# Patient Record
Sex: FEMALE | Race: WHITE | HISPANIC OR LATINO | URBAN - METROPOLITAN AREA
[De-identification: names, ages, dates, MRNs, and addresses within clinical notes are randomized per-mention and may not be internally consistent; named-entity substitution may affect disease eponyms.]

---

## 2023-01-23 PROBLEM — L60.0 INGROWN TOENAIL OF BOTH FEET: Status: ACTIVE | Noted: 2023-01-23

## 2023-01-23 PROBLEM — M79.672 FOOT PAIN, BILATERAL: Status: ACTIVE | Noted: 2023-01-23

## 2023-01-23 PROBLEM — R06.83 SNORING: Status: ACTIVE | Noted: 2023-01-23

## 2023-01-23 PROBLEM — R63.4 WEIGHT LOSS: Status: ACTIVE | Noted: 2023-01-23

## 2023-01-23 PROBLEM — M79.671 FOOT PAIN, BILATERAL: Status: ACTIVE | Noted: 2023-01-23

## 2023-01-23 PROBLEM — F41.9 ANXIETY DISORDER: Status: ACTIVE | Noted: 2023-01-23

## 2023-01-23 PROBLEM — L70.0 CYSTIC ACNE: Status: ACTIVE | Noted: 2023-01-23

## 2023-01-23 PROBLEM — F39 MOOD DISORDER (CMS-HCC): Status: ACTIVE | Noted: 2023-01-23

## 2023-01-23 RX ORDER — TRETINOIN 0.5 MG/G
CREAM TOPICAL
COMMUNITY
Start: 2022-03-16

## 2023-01-23 RX ORDER — NORGESTREL AND ETHINYL ESTRADIOL 0.3-0.03MG
1 KIT ORAL DAILY
COMMUNITY
Start: 2022-02-23 | End: 2023-06-24 | Stop reason: SDUPTHER

## 2023-01-23 RX ORDER — ESCITALOPRAM OXALATE 20 MG/1
1 TABLET ORAL DAILY
COMMUNITY
Start: 2022-03-10

## 2023-01-23 RX ORDER — LAMOTRIGINE 200 MG/1
200 TABLET ORAL DAILY
COMMUNITY
Start: 2021-05-19

## 2023-01-23 RX ORDER — SPIRONOLACTONE 100 MG/1
1 TABLET, FILM COATED ORAL DAILY
COMMUNITY
Start: 2022-03-16 | End: 2023-06-24 | Stop reason: SDUPTHER

## 2023-01-23 RX ORDER — TIRZEPATIDE 2.5 MG/.5ML
2.5 INJECTION, SOLUTION SUBCUTANEOUS
COMMUNITY
Start: 2022-09-23 | End: 2023-06-24

## 2023-06-24 ENCOUNTER — OFFICE VISIT (OUTPATIENT)
Dept: PRIMARY CARE | Facility: CLINIC | Age: 28
End: 2023-06-24
Payer: COMMERCIAL

## 2023-06-24 VITALS — DIASTOLIC BLOOD PRESSURE: 72 MMHG | SYSTOLIC BLOOD PRESSURE: 122 MMHG | WEIGHT: 270 LBS | BODY MASS INDEX: 44.93 KG/M2

## 2023-06-24 DIAGNOSIS — Z13.220 SCREENING FOR LIPID DISORDERS: ICD-10-CM

## 2023-06-24 DIAGNOSIS — F31.81 BIPOLAR 2 DISORDER (MULTI): ICD-10-CM

## 2023-06-24 DIAGNOSIS — E66.01 MORBID OBESITY (MULTI): ICD-10-CM

## 2023-06-24 DIAGNOSIS — Z23 NEED FOR VIRAL IMMUNIZATION: ICD-10-CM

## 2023-06-24 DIAGNOSIS — Z13.1 SCREENING FOR DIABETES MELLITUS: ICD-10-CM

## 2023-06-24 DIAGNOSIS — Z30.011 ENCOUNTER FOR INITIAL PRESCRIPTION OF CONTRACEPTIVE PILLS: ICD-10-CM

## 2023-06-24 DIAGNOSIS — F41.9 ANXIETY DISORDER, UNSPECIFIED TYPE: ICD-10-CM

## 2023-06-24 DIAGNOSIS — L70.0 CYSTIC ACNE: ICD-10-CM

## 2023-06-24 DIAGNOSIS — F39 MOOD DISORDER (CMS-HCC): Primary | ICD-10-CM

## 2023-06-24 PROBLEM — M79.671 FOOT PAIN, BILATERAL: Status: RESOLVED | Noted: 2023-01-23 | Resolved: 2023-06-24

## 2023-06-24 PROBLEM — L60.0 INGROWN TOENAIL OF BOTH FEET: Status: RESOLVED | Noted: 2023-01-23 | Resolved: 2023-06-24

## 2023-06-24 PROBLEM — R63.4 WEIGHT LOSS: Status: RESOLVED | Noted: 2023-01-23 | Resolved: 2023-06-24

## 2023-06-24 PROBLEM — M79.672 FOOT PAIN, BILATERAL: Status: RESOLVED | Noted: 2023-01-23 | Resolved: 2023-06-24

## 2023-06-24 LAB
ALANINE AMINOTRANSFERASE (SGPT) (U/L) IN SER/PLAS: 24 U/L (ref 7–45)
ALBUMIN (G/DL) IN SER/PLAS: 3.8 G/DL (ref 3.4–5)
ALKALINE PHOSPHATASE (U/L) IN SER/PLAS: 60 U/L (ref 33–110)
ANION GAP IN SER/PLAS: 13 MMOL/L (ref 10–20)
ASPARTATE AMINOTRANSFERASE (SGOT) (U/L) IN SER/PLAS: 25 U/L (ref 9–39)
BILIRUBIN TOTAL (MG/DL) IN SER/PLAS: 0.3 MG/DL (ref 0–1.2)
CALCIUM (MG/DL) IN SER/PLAS: 9.5 MG/DL (ref 8.6–10.3)
CARBON DIOXIDE, TOTAL (MMOL/L) IN SER/PLAS: 26 MMOL/L (ref 21–32)
CHLORIDE (MMOL/L) IN SER/PLAS: 104 MMOL/L (ref 98–107)
CHOLESTEROL (MG/DL) IN SER/PLAS: 247 MG/DL (ref 0–199)
CHOLESTEROL IN HDL (MG/DL) IN SER/PLAS: 54.6 MG/DL
CHOLESTEROL/HDL RATIO: 4.5
CREATININE (MG/DL) IN SER/PLAS: 0.91 MG/DL (ref 0.5–1.05)
GFR FEMALE: 89 ML/MIN/1.73M2
GLUCOSE (MG/DL) IN SER/PLAS: 96 MG/DL (ref 74–99)
LDL: 155 MG/DL (ref 0–99)
POTASSIUM (MMOL/L) IN SER/PLAS: 4.1 MMOL/L (ref 3.5–5.3)
PROTEIN TOTAL: 7.4 G/DL (ref 6.4–8.2)
SODIUM (MMOL/L) IN SER/PLAS: 139 MMOL/L (ref 136–145)
TRIGLYCERIDE (MG/DL) IN SER/PLAS: 185 MG/DL (ref 0–149)
UREA NITROGEN (MG/DL) IN SER/PLAS: 11 MG/DL (ref 6–23)
VLDL: 37 MG/DL (ref 0–40)

## 2023-06-24 PROCEDURE — 99214 OFFICE O/P EST MOD 30 MIN: CPT | Performed by: FAMILY MEDICINE

## 2023-06-24 PROCEDURE — 80053 COMPREHEN METABOLIC PANEL: CPT

## 2023-06-24 PROCEDURE — 1036F TOBACCO NON-USER: CPT | Performed by: FAMILY MEDICINE

## 2023-06-24 PROCEDURE — 3008F BODY MASS INDEX DOCD: CPT | Performed by: FAMILY MEDICINE

## 2023-06-24 PROCEDURE — 90471 IMMUNIZATION ADMIN: CPT | Performed by: FAMILY MEDICINE

## 2023-06-24 PROCEDURE — 80061 LIPID PANEL: CPT

## 2023-06-24 PROCEDURE — 90715 TDAP VACCINE 7 YRS/> IM: CPT | Performed by: FAMILY MEDICINE

## 2023-06-24 RX ORDER — NORGESTREL AND ETHINYL ESTRADIOL 0.3-0.03MG
1 KIT ORAL DAILY
Qty: 84 TABLET | Refills: 3 | Status: SHIPPED | OUTPATIENT
Start: 2023-06-24

## 2023-06-24 RX ORDER — SPIRONOLACTONE 100 MG/1
100 TABLET, FILM COATED ORAL DAILY
Qty: 90 TABLET | Refills: 3 | Status: SHIPPED | OUTPATIENT
Start: 2023-06-24

## 2023-06-24 NOTE — PROGRESS NOTES
Chief complaint:   Chief Complaint   Patient presents with    Establish Care     New patient establish [facility]       HPI:  Sindhu Herron is a 28 y.o. female who presents for evaluation as a new patient. She is interested in weight loss options and states she has been on a variety of things and tried different diets in the past.     She is not currently following with psychiatry but has been on Lamictal and Lexapro.     She is on OCP currently but within the year is interested in starting a family with her . She would like to reduce her risks of complications prior to pregnancy by weight loss and medication adjustments if needed.     She uses Spironolactone and Tretinoin for her acne.     Physical exam:  /72   Wt 122 kg (270 lb)   BMI 44.93 kg/m²   General: NAD, well appearing female  Heart: RRR, no mumur appreciated  Lungs: CTAB, no wheezes, rales, rhonchi  Abdomen: soft, non tender, normoactive BS, no organomegaly  Extremities: No LE edema    Assessment/Plan   Problem List Items Addressed This Visit       Anxiety disorder    Relevant Orders    Referral to Psychiatry    Cystic acne    Relevant Medications    spironolactone (Aldactone) 100 mg tablet    Mood disorder (CMS/HCC) - Primary    Relevant Orders    Referral to Psychiatry    Bipolar 2 disorder (CMS/[facility])    Relevant Orders    Referral to Psychiatry     Other Visit Diagnoses       Encounter for initial prescription of contraceptive pills        Relevant Medications    norgestrel-ethinyl estradioL (Low-Ogestrel, 28,) 0.3-30 mg-mcg tablet    Other Relevant Orders    Referral to Obstetrics / Gynecology    Need for viral immunization        Relevant Orders    Tdap vaccine, age 10 years and older  (BOOSTRIX) (Completed)    Screening for diabetes mellitus        Relevant Orders    Comprehensive Metabolic Panel    Screening for lipid disorders        Relevant Orders    Lipid Panel    Morbid obesity (CMS/[facility])        Relevant Orders    Referral to  Nutrition Services    BMI 40.0-44.9, adult (CMS/[facility])        Relevant Orders    Referral to Nutrition Services        Discussed need for psychiatrist for medication management due to Bipolar 2. Referral placed.   Discussed for her acne her medications would not be recommended in pregnancy which she understands to stop prior to trying  Discussed weight loss options and recommend dietician referral with follow up in 3+ mo with me.   Refilled OCP and referral placed to OBGYN for cervical cancer screening and discussion of medications and family planning.   Fasting labs ordered   Tdap booster given in office     Naz Lin DO

## 2023-09-13 ENCOUNTER — APPOINTMENT (OUTPATIENT)
Dept: PRIMARY CARE | Facility: CLINIC | Age: 28
End: 2023-09-13
Payer: COMMERCIAL

## 2023-09-14 ENCOUNTER — LAB (OUTPATIENT)
Dept: LAB | Facility: LAB | Age: 28
End: 2023-09-14
Payer: COMMERCIAL

## 2023-09-14 LAB — TOBACCO SCREEN, URINE: NEGATIVE

## 2023-09-15 ENCOUNTER — APPOINTMENT (OUTPATIENT)
Dept: PRIMARY CARE | Facility: CLINIC | Age: 28
End: 2023-09-15
Payer: COMMERCIAL

## 2024-01-10 ENCOUNTER — TELEPHONE (OUTPATIENT)
Dept: SURGERY | Facility: CLINIC | Age: 29
End: 2024-01-10

## 2024-01-10 ENCOUNTER — OFFICE VISIT (OUTPATIENT)
Dept: OBSTETRICS AND GYNECOLOGY | Facility: CLINIC | Age: 29
End: 2024-01-10
Payer: COMMERCIAL

## 2024-01-10 VITALS
WEIGHT: 280.4 LBS | SYSTOLIC BLOOD PRESSURE: 124 MMHG | DIASTOLIC BLOOD PRESSURE: 76 MMHG | HEIGHT: 65 IN | BODY MASS INDEX: 46.72 KG/M2

## 2024-01-10 DIAGNOSIS — E66.01 CLASS 3 SEVERE OBESITY DUE TO EXCESS CALORIES WITHOUT SERIOUS COMORBIDITY IN ADULT, UNSPECIFIED BMI (MULTI): ICD-10-CM

## 2024-01-10 DIAGNOSIS — Z01.419 WELL FEMALE EXAM WITH ROUTINE GYNECOLOGICAL EXAM: ICD-10-CM

## 2024-01-10 PROCEDURE — 1036F TOBACCO NON-USER: CPT | Performed by: OBSTETRICS & GYNECOLOGY

## 2024-01-10 PROCEDURE — 88175 CYTOPATH C/V AUTO FLUID REDO: CPT

## 2024-01-10 PROCEDURE — 3008F BODY MASS INDEX DOCD: CPT | Performed by: OBSTETRICS & GYNECOLOGY

## 2024-01-10 PROCEDURE — 99385 PREV VISIT NEW AGE 18-39: CPT | Performed by: OBSTETRICS & GYNECOLOGY

## 2024-01-10 ASSESSMENT — PATIENT HEALTH QUESTIONNAIRE - PHQ9
2. FEELING DOWN, DEPRESSED OR HOPELESS: NOT AT ALL
SUM OF ALL RESPONSES TO PHQ9 QUESTIONS 1 & 2: 0
1. LITTLE INTEREST OR PLEASURE IN DOING THINGS: NOT AT ALL

## 2024-01-10 NOTE — PROGRESS NOTES
"NPV, Annual Exam    Pap: Unknown, but no abnormals  Lis: Never  LMP: 10/26/2023, continuous OCP's  CC: Discuss possible pregnancy     Pamela Murdock MA II     ANNUAL SUBJECTIVE    Sindhu Herron is a 28 y.o. female who presents for annual exam today.  Her periods are irregular (on continuous ocps so usually doesn't get).  She is using OCPs for contraception and is happy with this.  She has no complaints today.  Due for pap and wants to discuss pregnancy.    PMH - obesity, depression/anxiety    PSH - none    OB history - G0  The patient has never been pregnant.    Last pap -   Unsure when, no h/o abnormals    Last mammogram - not indicated    Family history of breast or ovarian cancer - no    OBJECTIVE  /76   Ht 1.651 m (5' 5\")   Wt 127 kg (280 lb 6.4 oz)   LMP  (LMP Unknown)   Breastfeeding No   BMI 46.66 kg/m²     General Appearance   - consistent with stated age, well groomed and cooperative    Integumentary  - skin warm and dry without rash    Head and Neck  - normalocephalic and neck supple    Chest and Lung Exam  - normal breathing effort, no respiratory distress    Breast  - symmetry noted, no mass palpable, no skin change and no nipple discharge.    Abdomen  - soft, nontender and no hepatomegaly, splenomegaly, or mass    Female Genitourinary  - vulva normal without rash or lesion, normal vaginal rugae, no vaginal discharge, unable to appreciate uterine size or adnexal mass,  no adnexal tenderness, no cervical motion tenderness    Peripheral Vascular  - no edema present    ASSESSMENT/PLAN  28 y.o. yo G0 female who presents for annual exam.       Actions performed during this visit include:  - Clinical breast exam normal  - Clinical pelvic exam normal  - Pap: done today  - Mammogram not indicated  - Contraception continue COCs  -  Preconception counseling: long discussion with patient regarding irregular periods when not on OCPs (q 2-3 months), likely PCOS and current BMI of 46.  Patient has tried " many diets, medications etc to help her lose weight. Discussed that weight loss can restore more regular ovulation and makes a future pregnancy less risky.  We discussed that obesity is a risk factor for hypertensive disorders of pregnancy, gestational diabetes,  section etc.  Recommend bariatric surgery consultation.  Discussed stopping her spironolactone when she goes off OCPs but also discussed that optimizing her weight prior to attempting pregnancy is recommended.   - Referrals bariatric surgery referral placed    Please return for your next visit in 1 year or earlier with any concerns.    Stella Marley MD

## 2024-01-25 LAB
CYTOLOGY CMNT CVX/VAG CYTO-IMP: NORMAL
LAB AP HPV GENOTYPE QUESTION: NO
LAB AP HPV HR: NORMAL
LABORATORY COMMENT REPORT: NORMAL
PATH REPORT.TOTAL CANCER: NORMAL

## 2024-01-30 ENCOUNTER — TELEPHONE (OUTPATIENT)
Dept: OBSTETRICS AND GYNECOLOGY | Facility: CLINIC | Age: 29
End: 2024-01-30
Payer: COMMERCIAL

## 2024-01-31 NOTE — PROGRESS NOTES
Subjective   Patient ID: Sindhu Herron is a 28 y.o. female who presents for No chief complaint on file..  HPI  BARIATRIC CONSULT  START WT:  275     IDEAL WT:    151     START EXCESS:  124         HT: 65.25  YRS OF OBESITY: 13  GREATEST WT LOSS IN LBS: 20  PROGRAMS FOR WT LOSS IN THE PAST INJECTIONS, METFORMIN   WORKS IN THE LIBRARY FIELD (ASSISTANT )   Review of Systems     Allergy/Immunologic:          HIV / AIDS No.  Hepatitis A No.  Hepatitis B No.  Hepatitis C No.  Immunosuppressent drugs No.         HEENT:          Headache negative.         CARDIOLOGY:          History of Hyperlipidemia YES.  Last stress test N/A.  Last echocardiogram N/A.  Chest pain No.  High blood pressure No.  Irregular heart beat No.  Known coronary artery disease No.  Pacemaker No.  Palpitations No.         RESPIRATORY:          Hx steroid use No.  ER visits or Hospitalizations for breathing problems No.  Sleep Apnea SNORING,DAYTIME SLEEPINESS.  HERRERA (dyspnea on exertion) YES.  Hx of Asthma/COPD No.         GASTROENTEROLOGY:          Peptic ulcer No, Last EGD N/A, Last UGI N/A.  Colonoscopy Last Colonoscopy N/A.  Heartburn YES.         ENDOCRINOLOGY:          Diabetes No.  Thyroid disorder No.         EXTREMITIES:          Varicose Veins No.  Stasis Ulcers No.  Ankle swelling No.  Personal history DVT No.  Personal history PE No.  Personal history of other thrombolic events No.  Family history of VTE No.  Known genetic bleeding or clotting disorder No.         FEMALE REPRODUCTIVE:          Uterine fibroids No.  Ovarian Cyst No.  Infertility No.  Menstrual history PCOS         UROLOGY:          Kidney disease No.  Kidney stones No.  Previous UTIs No.  Urinary incontinence No.         MUSCULOSKELETAL:          Osteoporosis/Osteopenia No.  Arthritis No.  Joint pain No.         SKIN:          Hidradenitis No.  Open skin wounds No.  Rosacea No.  Healing problems No.   ADMITS TO CYSTIC ACNE      PSYCHOLOGY:          Anxiety  YES.  Depression YES  ADMITS TO MOOD DISORDER Eating disorder denies.    Objective   Physical Exam    Assessment/Plan            Laila Le LPN 01/31/24 2:11 PM

## 2024-02-05 RX ORDER — HYDROXYZINE HYDROCHLORIDE 25 MG/1
10 TABLET, FILM COATED ORAL
COMMUNITY
Start: 2024-01-24

## 2024-02-05 ASSESSMENT — LIFESTYLE VARIABLES
SKIP TO QUESTIONS 9-10: 1
HOW OFTEN DO YOU HAVE A DRINK CONTAINING ALCOHOL: 2-3 TIMES A WEEK
HOW MANY STANDARD DRINKS CONTAINING ALCOHOL DO YOU HAVE ON A TYPICAL DAY: 1 OR 2
AUDIT-C TOTAL SCORE: 3
HOW OFTEN DO YOU HAVE SIX OR MORE DRINKS ON ONE OCCASION: NEVER

## 2024-02-05 NOTE — PROGRESS NOTES
Initial Medical Weight Loss Appointment (MWL 1)     Starting Weight: 275 lb/ BMI: 45.41    Diet Experience: Sindhu reports that she has thought about surgery for awhile now. Pt reports she saw weight gain when she started birth control. Pt reports that she tried many diets in the past, including Weight Watchers, Noom, and using MyFitnessPal. Pt reports she also saw a nutritionist for 3-6 months and followed a 2,000 daily calorie diet. Pt reports she saw some weight loss. Pt reports she also tried many weight loss medications (Metformin, Phentermine, Mounjaro) and saw some weight loss with them as well. Pt reports she struggled with binge eating when she was teenager.   Medical hx: PCOS, bipolar disorder. Pt reports having heart burn for a couple of years.   Pt Seeking: RYGB   Pertinent Co-morbidities: none that are known   Current Daily Intake:   Breakfast- honey nut cheerios with a piece of furit (cherries or an orange), OR a smoothie with berries, greek yogurt and almond milk  Lunch- sushi bowl (crab, avocado, cucumber, rice with soy sauce or yum yum sauce), OR avocado pasta using Barilla protein pasta, OR a handful of trail mix due to low hunger on occ   Dinner- usually baked/stove top cooked chicken with rice and a vegetable. OR chicken enchiladas, OR a pasta dish on occ   How many times do you order takeout, fast food and/or go out to eat per week? 1-2x  Snacks: M&M's at work on occ   Beverages:  oz of water/day, pop/juice 1-2x/month, almond milk  Alcohol Intake: 1-2x/week, 1 drink per occasion   Food Allergies? NKFAS  Food Intolerances? Milk  Food Dislikes? Asparagus, beets, beef, pork   Current Exercise/Exercise Hx: walking dog everyday for 30 minutes to 1 hour. Exercise hx includes the gym, and trampoline classes.   Hours of sleep per night: 8-10. Pt reports it is disrupted sleep due to using the bathroom during the night.   Work schedule: 9 am to 6 pm or 1 pm to 9 pm, 5 days per week.   Who is in the  household? Herself and her    Who does the cooking/grocery shopping? She does both   What do you want to get out of surgery? Feeling more confident, being more active, having a tool for help.   Motivation to change (1-10): 10      Today's Lesson: Review of Dr. Mora's lifelong rules, calorie counting, food label reading, promoting feelings of satiety, and energy balance.  Diet Goals: Practice the lifelong rules  Exercise Recommendations: Gradually work up to 150 minutes of moderate intensity exercise per week.  Behavior Changes: will be assessed every month  Behavior Goals:  1. Incorporate lean protein choices at each meal. At least 20 grams of protein per meal.  2. Mindful of fat intake. Limit/avoid.     Plan: Will follow up next month. Will continue to monitor pt's weight, dietary & behavior changes.          Sagrario Mena RD, LDN  Registered Dietitian, Licensed Dietitian Nutritionist

## 2024-02-08 ENCOUNTER — NUTRITION (OUTPATIENT)
Dept: SURGERY | Facility: CLINIC | Age: 29
End: 2024-02-08
Payer: COMMERCIAL

## 2024-02-08 ENCOUNTER — OFFICE VISIT (OUTPATIENT)
Dept: SURGERY | Facility: CLINIC | Age: 29
End: 2024-02-08
Payer: COMMERCIAL

## 2024-02-08 VITALS
HEART RATE: 84 BPM | DIASTOLIC BLOOD PRESSURE: 83 MMHG | SYSTOLIC BLOOD PRESSURE: 135 MMHG | BODY MASS INDEX: 45.82 KG/M2 | HEIGHT: 65 IN | WEIGHT: 275 LBS

## 2024-02-08 VITALS
WEIGHT: 275 LBS | HEIGHT: 65 IN | DIASTOLIC BLOOD PRESSURE: 83 MMHG | HEART RATE: 84 BPM | BODY MASS INDEX: 45.82 KG/M2 | RESPIRATION RATE: 16 BRPM | SYSTOLIC BLOOD PRESSURE: 135 MMHG

## 2024-02-08 DIAGNOSIS — K21.9 GERD WITHOUT ESOPHAGITIS: Primary | ICD-10-CM

## 2024-02-08 DIAGNOSIS — E78.00 HYPERCHOLESTEROLEMIA: ICD-10-CM

## 2024-02-08 DIAGNOSIS — E53.8 B12 DEFICIENCY: ICD-10-CM

## 2024-02-08 DIAGNOSIS — K21.9 GERD WITHOUT ESOPHAGITIS: ICD-10-CM

## 2024-02-08 DIAGNOSIS — E07.9 DISEASE OF THYROID GLAND: ICD-10-CM

## 2024-02-08 DIAGNOSIS — E51.9 THIAMINE DEFICIENCY: ICD-10-CM

## 2024-02-08 DIAGNOSIS — Z01.818 PRE-OP EVALUATION: ICD-10-CM

## 2024-02-08 DIAGNOSIS — D68.9 COAGULATION DISORDER (MULTI): ICD-10-CM

## 2024-02-08 DIAGNOSIS — F41.9 ANXIETY: ICD-10-CM

## 2024-02-08 DIAGNOSIS — E55.9 VITAMIN D DEFICIENCY: ICD-10-CM

## 2024-02-08 DIAGNOSIS — Z71.3 ENCOUNTER FOR NUTRITION EVALUATION PRIOR TO BARIATRIC SURGERY: ICD-10-CM

## 2024-02-08 DIAGNOSIS — E66.01 MORBID OBESITY WITH BMI OF 45.0-49.9, ADULT (MULTI): Primary | ICD-10-CM

## 2024-02-08 DIAGNOSIS — E61.0 COPPER DEFICIENCY: ICD-10-CM

## 2024-02-08 DIAGNOSIS — G47.33 OBSTRUCTIVE SLEEP APNEA: ICD-10-CM

## 2024-02-08 DIAGNOSIS — R73.9 HYPERGLYCEMIA: ICD-10-CM

## 2024-02-08 DIAGNOSIS — D50.8 IRON DEFICIENCY ANEMIA SECONDARY TO INADEQUATE DIETARY IRON INTAKE: ICD-10-CM

## 2024-02-08 DIAGNOSIS — Z30.41 USES ORAL CONTRACEPTION: ICD-10-CM

## 2024-02-08 DIAGNOSIS — E66.01 CLASS 3 SEVERE OBESITY DUE TO EXCESS CALORIES WITHOUT SERIOUS COMORBIDITY IN ADULT, UNSPECIFIED BMI (MULTI): ICD-10-CM

## 2024-02-08 DIAGNOSIS — E63.9 NUTRITIONAL DISORDER: ICD-10-CM

## 2024-02-08 PROCEDURE — 99204 OFFICE O/P NEW MOD 45 MIN: CPT | Performed by: SURGERY

## 2024-02-08 PROCEDURE — 99204 OFFICE O/P NEW MOD 45 MIN: CPT | Performed by: INTERNAL MEDICINE

## 2024-02-08 PROCEDURE — 1036F TOBACCO NON-USER: CPT | Performed by: INTERNAL MEDICINE

## 2024-02-08 PROCEDURE — 3008F BODY MASS INDEX DOCD: CPT | Performed by: SURGERY

## 2024-02-08 PROCEDURE — 93000 ELECTROCARDIOGRAM COMPLETE: CPT | Performed by: INTERNAL MEDICINE

## 2024-02-08 PROCEDURE — 3008F BODY MASS INDEX DOCD: CPT | Performed by: INTERNAL MEDICINE

## 2024-02-08 PROCEDURE — 1036F TOBACCO NON-USER: CPT | Performed by: SURGERY

## 2024-02-08 RX ORDER — OMEPRAZOLE 40 MG/1
40 CAPSULE, DELAYED RELEASE ORAL
Qty: 30 CAPSULE | Refills: 6 | Status: SHIPPED | OUTPATIENT
Start: 2024-02-08 | End: 2024-04-03

## 2024-02-08 ASSESSMENT — PATIENT HEALTH QUESTIONNAIRE - PHQ9
SUM OF ALL RESPONSES TO PHQ9 QUESTIONS 1 AND 2: 0
SUM OF ALL RESPONSES TO PHQ9 QUESTIONS 1 AND 2: 0
2. FEELING DOWN, DEPRESSED OR HOPELESS: NOT AT ALL
1. LITTLE INTEREST OR PLEASURE IN DOING THINGS: NOT AT ALL
2. FEELING DOWN, DEPRESSED OR HOPELESS: NOT AT ALL
1. LITTLE INTEREST OR PLEASURE IN DOING THINGS: NOT AT ALL

## 2024-02-08 ASSESSMENT — ENCOUNTER SYMPTOMS
ABDOMINAL PAIN: 0
COUGH: 0
APPETITE CHANGE: 0
CONSTIPATION: 0
CHILLS: 0
DEPRESSION: 0
LOSS OF SENSATION IN FEET: 0
RHINORRHEA: 0
DIZZINESS: 0
FATIGUE: 0
FEVER: 0
ARTHRALGIAS: 0
OCCASIONAL FEELINGS OF UNSTEADINESS: 0
SHORTNESS OF BREATH: 0
DIFFICULTY URINATING: 0
ROS GI COMMENTS: HEARTBURN
VOMITING: 0
JOINT SWELLING: 0
SINUS PAIN: 0
OCCASIONAL FEELINGS OF UNSTEADINESS: 0
HEMATURIA: 0
SORE THROAT: 0
NAUSEA: 0
FREQUENCY: 0
SLEEP DISTURBANCE: 0
LOSS OF SENSATION IN FEET: 0
PALPITATIONS: 0
DIARRHEA: 0
WEAKNESS: 0
HEADACHES: 0
DEPRESSION: 0

## 2024-02-08 ASSESSMENT — LIFESTYLE VARIABLES
SKIP TO QUESTIONS 9-10: 1
HOW MANY STANDARD DRINKS CONTAINING ALCOHOL DO YOU HAVE ON A TYPICAL DAY: 1 OR 2
HOW OFTEN DO YOU HAVE A DRINK CONTAINING ALCOHOL: 2-4 TIMES A MONTH
AUDIT-C TOTAL SCORE: 2
HOW OFTEN DO YOU HAVE SIX OR MORE DRINKS ON ONE OCCASION: NEVER

## 2024-02-08 ASSESSMENT — PAIN SCALES - GENERAL
PAINLEVEL: 0-NO PAIN
PAINLEVEL: 0-NO PAIN

## 2024-02-08 ASSESSMENT — COLUMBIA-SUICIDE SEVERITY RATING SCALE - C-SSRS
1. IN THE PAST MONTH, HAVE YOU WISHED YOU WERE DEAD OR WISHED YOU COULD GO TO SLEEP AND NOT WAKE UP?: NO
6. HAVE YOU EVER DONE ANYTHING, STARTED TO DO ANYTHING, OR PREPARED TO DO ANYTHING TO END YOUR LIFE?: NO
2. HAVE YOU ACTUALLY HAD ANY THOUGHTS OF KILLING YOURSELF?: NO

## 2024-02-08 NOTE — PROGRESS NOTES
"Subjective   Patient ID: Sindhu Herron is a 28 y.o. female who presents for [facility] visit 1. Patient has been trying to lose weight for many years by following different diets like Weight Watchers, lost some weight but did not maintain. Currently has 3 meals a day. Breakfast includes fruit, cheerios, or smoothie. Lunch consists of pasta, vegetables, or chicken & rice. For dinner, cooks at home, meat, rice, and vegetables. Does not snack. Drinks water and almond milk. Reports snoring and heartburn.    Diagnostics Reviewed: 2/2024 EKG NSR. 7/2022 sleep study was normal.  Labs Reviewed:         Review of Systems   Constitutional:  Negative for appetite change, chills, fatigue and fever.   HENT:  Negative for ear pain, rhinorrhea, sinus pain and sore throat.    Eyes:  Negative for visual disturbance.   Respiratory:  Negative for cough and shortness of breath.         Snoring   Cardiovascular:  Negative for chest pain and palpitations.   Gastrointestinal:  Negative for abdominal pain, constipation, diarrhea, nausea and vomiting.        Heartburn   Genitourinary:  Negative for difficulty urinating, frequency and hematuria.   Musculoskeletal:  Negative for arthralgias and joint swelling.   Skin:  Negative for rash.   Neurological:  Negative for dizziness, weakness and headaches.   Psychiatric/Behavioral:  Negative for sleep disturbance.         Anxiety (controlled)       Objective   /83   Pulse 84   Resp 16   Ht 1.657 m (5' 5.25\")   Wt 125 kg (275 lb)   LMP  (LMP Unknown)   BMI 45.41 kg/m²     Physical Exam  Constitutional:       General: She is not in acute distress.     Appearance: She is obese.   HENT:      Mouth/Throat:      Comments: Dentition WNL  Cardiovascular:      Rate and Rhythm: Normal rate and regular rhythm.      Heart sounds: Normal heart sounds.   Pulmonary:      Breath sounds: Normal breath sounds.   Abdominal:      Palpations: Abdomen is soft.      Tenderness: There is no abdominal tenderness. "   Musculoskeletal:         General: Normal range of motion.      Cervical back: No tenderness.      Right lower leg: No edema.      Left lower leg: No edema.   Lymphadenopathy:      Cervical: No cervical adenopathy.   Skin:     General: Skin is warm.      Findings: No erythema.   Neurological:      General: No focal deficit present.      Mental Status: She is alert and oriented to person, place, and time.      Gait: Gait is intact.   Psychiatric:         Mood and Affect: Mood normal.         Behavior: Behavior normal.         Assessment/Plan   Diagnoses and all orders for this visit:  Coagulation disorder (CMS/[facility])  -     CBC and Auto Differential; Future  -     aPTT; Future  -     Hemoglobin A1C; Future  -     Folate; Future  -     Ferritin; Future  -     Comprehensive Metabolic Panel; Future  -     TSH with reflex to Free T4 if abnormal; Future  -     Protime-INR; Future  -     Vitamin D 25-Hydroxy,Total (for eval of Vitamin D levels); Future  -     Vitamin B12; Future  -     Parathyroid Hormone, Intact; Future  -     Vitamin B1, Whole Blood; Future  -     Copper, Blood; Future  -     Vitamin A; Future  -     Vitamin E; Future  -     Zinc, Serum or Plasma; Future  -     Iron and TIBC; Future  Pre-op evaluation  -     ECG 12 lead (Clinic Performed)  -     CBC and Auto Differential; Future  -     aPTT; Future  -     Hemoglobin A1C; Future  -     Folate; Future  -     Ferritin; Future  -     Comprehensive Metabolic Panel; Future  -     TSH with reflex to Free T4 if abnormal; Future  -     Protime-INR; Future  -     Vitamin D 25-Hydroxy,Total (for eval of Vitamin D levels); Future  -     Vitamin B12; Future  -     Parathyroid Hormone, Intact; Future  -     Vitamin B1, Whole Blood; Future  -     Copper, Blood; Future  -     Vitamin A; Future  -     Vitamin E; Future  -     Zinc, Serum or Plasma; Future  -     Iron and TIBC; Future  Copper deficiency  -     CBC and Auto Differential; Future  -     aPTT; Future  -      Hemoglobin A1C; Future  -     Folate; Future  -     Ferritin; Future  -     Comprehensive Metabolic Panel; Future  -     TSH with reflex to Free T4 if abnormal; Future  -     Protime-INR; Future  -     Vitamin D 25-Hydroxy,Total (for eval of Vitamin D levels); Future  -     Vitamin B12; Future  -     Parathyroid Hormone, Intact; Future  -     Vitamin B1, Whole Blood; Future  -     Copper, Blood; Future  -     Vitamin A; Future  -     Vitamin E; Future  -     Zinc, Serum or Plasma; Future  -     Iron and TIBC; Future  Encounter for nutrition evaluation prior to bariatric surgery  -     CBC and Auto Differential; Future  -     aPTT; Future  -     Hemoglobin A1C; Future  -     Folate; Future  -     Ferritin; Future  -     Comprehensive Metabolic Panel; Future  -     TSH with reflex to Free T4 if abnormal; Future  -     Protime-INR; Future  -     Vitamin D 25-Hydroxy,Total (for eval of Vitamin D levels); Future  -     Vitamin B12; Future  -     Parathyroid Hormone, Intact; Future  -     Vitamin B1, Whole Blood; Future  -     Copper, Blood; Future  -     Vitamin A; Future  -     Vitamin E; Future  -     Zinc, Serum or Plasma; Future  -     Iron and TIBC; Future  Hyperglycemia  -     CBC and Auto Differential; Future  -     aPTT; Future  -     Hemoglobin A1C; Future  -     Folate; Future  -     Ferritin; Future  -     Comprehensive Metabolic Panel; Future  -     TSH with reflex to Free T4 if abnormal; Future  -     Protime-INR; Future  -     Vitamin D 25-Hydroxy,Total (for eval of Vitamin D levels); Future  -     Vitamin B12; Future  -     Parathyroid Hormone, Intact; Future  -     Vitamin B1, Whole Blood; Future  -     Copper, Blood; Future  -     Vitamin A; Future  -     Vitamin E; Future  -     Zinc, Serum or Plasma; Future  -     Iron and TIBC; Future  B12 deficiency  -     CBC and Auto Differential; Future  -     aPTT; Future  -     Hemoglobin A1C; Future  -     Folate; Future  -     Ferritin; Future  -      Comprehensive Metabolic Panel; Future  -     TSH with reflex to Free T4 if abnormal; Future  -     Protime-INR; Future  -     Vitamin D 25-Hydroxy,Total (for eval of Vitamin D levels); Future  -     Vitamin B12; Future  -     Parathyroid Hormone, Intact; Future  -     Vitamin B1, Whole Blood; Future  -     Copper, Blood; Future  -     Vitamin A; Future  -     Vitamin E; Future  -     Zinc, Serum or Plasma; Future  -     Iron and TIBC; Future  Disease of thyroid gland  -     CBC and Auto Differential; Future  -     aPTT; Future  -     Hemoglobin A1C; Future  -     Folate; Future  -     Ferritin; Future  -     Comprehensive Metabolic Panel; Future  -     TSH with reflex to Free T4 if abnormal; Future  -     Protime-INR; Future  -     Vitamin D 25-Hydroxy,Total (for eval of Vitamin D levels); Future  -     Vitamin B12; Future  -     Parathyroid Hormone, Intact; Future  -     Vitamin B1, Whole Blood; Future  -     Copper, Blood; Future  -     Vitamin A; Future  -     Vitamin E; Future  -     Zinc, Serum or Plasma; Future  -     Iron and TIBC; Future  Iron deficiency anemia secondary to inadequate dietary iron intake  -     CBC and Auto Differential; Future  -     aPTT; Future  -     Hemoglobin A1C; Future  -     Folate; Future  -     Ferritin; Future  -     Comprehensive Metabolic Panel; Future  -     TSH with reflex to Free T4 if abnormal; Future  -     Protime-INR; Future  -     Vitamin D 25-Hydroxy,Total (for eval of Vitamin D levels); Future  -     Vitamin B12; Future  -     Parathyroid Hormone, Intact; Future  -     Vitamin B1, Whole Blood; Future  -     Copper, Blood; Future  -     Vitamin A; Future  -     Vitamin E; Future  -     Zinc, Serum or Plasma; Future  -     Iron and TIBC; Future  Nutritional disorder  -     CBC and Auto Differential; Future  -     aPTT; Future  -     Hemoglobin A1C; Future  -     Folate; Future  -     Ferritin; Future  -     Comprehensive Metabolic Panel; Future  -     TSH with reflex to  Free T4 if abnormal; Future  -     Protime-INR; Future  -     Vitamin D 25-Hydroxy,Total (for eval of Vitamin D levels); Future  -     Vitamin B12; Future  -     Parathyroid Hormone, Intact; Future  -     Vitamin B1, Whole Blood; Future  -     Copper, Blood; Future  -     Vitamin A; Future  -     Vitamin E; Future  -     Zinc, Serum or Plasma; Future  -     Iron and TIBC; Future  Obstructive sleep apnea  -     CBC and Auto Differential; Future  -     aPTT; Future  -     Hemoglobin A1C; Future  -     Folate; Future  -     Ferritin; Future  -     Comprehensive Metabolic Panel; Future  -     TSH with reflex to Free T4 if abnormal; Future  -     Protime-INR; Future  -     Vitamin D 25-Hydroxy,Total (for eval of Vitamin D levels); Future  -     Vitamin B12; Future  -     Parathyroid Hormone, Intact; Future  -     Vitamin B1, Whole Blood; Future  -     Copper, Blood; Future  -     Vitamin A; Future  -     Vitamin E; Future  -     Zinc, Serum or Plasma; Future  -     Iron and TIBC; Future  -     Home sleep apnea test (HSAT); Future  Thiamine deficiency  -     CBC and Auto Differential; Future  -     aPTT; Future  -     Hemoglobin A1C; Future  -     Folate; Future  -     Ferritin; Future  -     Comprehensive Metabolic Panel; Future  -     TSH with reflex to Free T4 if abnormal; Future  -     Protime-INR; Future  -     Vitamin D 25-Hydroxy,Total (for eval of Vitamin D levels); Future  -     Vitamin B12; Future  -     Parathyroid Hormone, Intact; Future  -     Vitamin B1, Whole Blood; Future  -     Copper, Blood; Future  -     Vitamin A; Future  -     Vitamin E; Future  -     Zinc, Serum or Plasma; Future  -     Iron and TIBC; Future  Vitamin D deficiency  -     CBC and Auto Differential; Future  -     aPTT; Future  -     Hemoglobin A1C; Future  -     Folate; Future  -     Ferritin; Future  -     Comprehensive Metabolic Panel; Future  -     TSH with reflex to Free T4 if abnormal; Future  -     Protime-INR; Future  -      Vitamin D 25-Hydroxy,Total (for eval of Vitamin D levels); Future  -     Vitamin B12; Future  -     Parathyroid Hormone, Intact; Future  -     Vitamin B1, Whole Blood; Future  -     Copper, Blood; Future  -     Vitamin A; Future  -     Vitamin E; Future  -     Zinc, Serum or Plasma; Future  -     Iron and TIBC; Future  GERD without esophagitis  -     H. Pylori Antigen, Stool; Future  -     omeprazole (PriLOSEC) 40 mg DR capsule; Take 1 capsule (40 mg) by mouth once daily in the morning. Take before meals. Do not crush or chew.  Anxiety      Scribe Attestation  By signing my name below, I, Reema Shook, Scribe   attest that this documentation has been prepared under the direction and in the presence of Selena Valadez MD.

## 2024-02-13 PROBLEM — E78.00 HYPERCHOLESTEROLEMIA: Status: ACTIVE | Noted: 2024-02-13

## 2024-02-13 PROBLEM — E66.01 MORBID OBESITY WITH BMI OF 45.0-49.9, ADULT (MULTI): Status: ACTIVE | Noted: 2024-02-13

## 2024-02-13 PROBLEM — Z30.41 USES ORAL CONTRACEPTION: Status: ACTIVE | Noted: 2024-02-13

## 2024-02-13 NOTE — H&P
History Of Present Illness  Sindhu Herron is a 28 y.o. female here for consultation for bariatric surgery. She suffers from morbid obesity and has been overweight for many years and has a number of weight related comorbidities. She has attempted to lose weight several times over the years. She has had successes but has regained the weight at the completion of each of her dieting attempts. She is being referred for surgical intervention for her clinically significant morbid obesity.     BARIATRIC CONSULT  START WT:  275     IDEAL WT:    151     START EXCESS:  124         HT: 65.25  YRS OF OBESITY: 13  GREATEST WT LOSS IN LBS: 20  PROGRAMS FOR WT LOSS IN THE PAST INJECTIONS, METFORMIN   WORKS IN THE LIBRARY FIELD (ASSISTANT )   Past Medical History  Past Medical History:   Diagnosis Date    Anxiety disorder, unspecified     Anxiety and depression    Foot pain, bilateral 01/23/2023    High cholesterol     Ingrown toenail of both feet 01/23/2023    Personal history of other endocrine, nutritional and metabolic disease     History of high cholesterol       Surgical History  Past Surgical History:   Procedure Laterality Date    OTHER SURGICAL HISTORY  07/05/2022    [place] tooth extraction        Social History  She reports that she has never smoked. She has never been exposed to tobacco smoke. She has never used smokeless tobacco. She reports current alcohol use. She reports that she does not use drugs.    Family History  Family History   Problem Relation Name Age of Onset    Hypertension Mother      Hyperlipidemia Mother      Other (MORBID OBESITY) Mother      Diabetes Mother      Hypertension Father      Hyperlipidemia Father      Other (MORBID OBESITY) Father      Diabetes Father      Leukemia Other GRANDFATHER     Cancer Other GRANDMOTHER         RENAL/KIDNEY    Melanoma Other GRANDMOTHER         Allergies  Patient has no known allergies.    Review of Systems   Allergy/Immunologic:          HIV /  AIDS No.  Hepatitis A No.  Hepatitis B No.  Hepatitis C No.  Immunosuppressent drugs No.         HEENT:          Headache negative.         CARDIOLOGY:          History of Hyperlipidemia YES.  Last stress test N/A.  Last echocardiogram N/A.  Chest pain No.  High blood pressure No.  Irregular heart beat No.  Known coronary artery disease No.  Pacemaker No.  Palpitations No.         RESPIRATORY:          Hx steroid use No.  ER visits or Hospitalizations for breathing problems No.  Sleep Apnea SNORING,DAYTIME SLEEPINESS.  HERRERA (dyspnea on exertion) YES.  Hx of Asthma/COPD No.         GASTROENTEROLOGY:          Peptic ulcer No, Last EGD N/A, Last UGI N/A.  Colonoscopy Last Colonoscopy N/A.  Heartburn YES.         ENDOCRINOLOGY:          Diabetes No.  Thyroid disorder No.         EXTREMITIES:          Varicose Veins No.  Stasis Ulcers No.  Ankle swelling No.  Personal history DVT No.  Personal history PE No.  Personal history of other thrombolic events No.  Family history of VTE No.  Known genetic bleeding or clotting disorder No.         FEMALE REPRODUCTIVE:          Uterine fibroids No.  Ovarian Cyst No.  Infertility No.  Menstrual history PCOS         UROLOGY:          Kidney disease No.  Kidney stones No.  Previous UTIs No.  Urinary incontinence No.         MUSCULOSKELETAL:          Osteoporosis/Osteopenia No.  Arthritis No.  Joint pain No.         SKIN:          Hidradenitis No.  Open skin wounds No.  Rosacea No.  Healing problems No.   ADMITS TO CYSTIC ACNE      PSYCHOLOGY:          Anxiety YES.  Depression YES  ADMITS TO MOOD DISORDER Eating disorder denies.          Physical Exam       General Examination:         GENERAL APPEARANCE: alert and oriented x 3, Pleasant and cooperative, No Acute Distress.          HEENT: PERRLA.          NECK: no lymphadenopathy, no thyromegaly, no JVD, normal flexion, normal extension.          HEART: regular rate and rhythm.          LUNGS: clear to auscultation bilaterally.          " CHEST: normal shape and expansion.          ABDOMEN: obese, no hernias present, soft and not tender, no guarding, no CVA tenderness.          EXTREMITIES: pulses 2 plus bilaterally, trace bilateral edema, no ulcerations.          SKIN: normal, no rash.          NEUROLOGIC EXAM: CN's II-XII grossly intact, gait normal.          BACK: no CVA tenderness.       Last Recorded Vitals  Blood pressure 135/83, pulse 84, height 1.657 m (5' 5.25\"), weight 125 kg (275 lb), not currently breastfeeding.       Assessment/Plan   Problem List Items Addressed This Visit             ICD-10-CM       Cardiac and Vasculature    Hypercholesterolemia E78.00       Endocrine/Metabolic    Morbid obesity with BMI of 45.0-49.9, adult (CMS/HCC) - Primary E66.01, Z68.42       Genitourinary and Reproductive    Uses oral contraception Z30.41     Other Visit Diagnoses         Codes    Class 3 severe obesity due to excess calories without serious comorbidity in adult, unspecified BMI (CMS/HCC)     E66.01    GERD without esophagitis     K21.9    Relevant Orders    EGD    Request for Pre-Admission Testing Visit            We spent 45 minutes reviewing the three surgical options available in the treatment of morbid obesity in our practice. We reviewed the laparoscopic approach to the Yoel-en-Y gastric bypass, the vertical sleeve gastrectomy, and the adjustable gastric band. We reviewed the surgical technique, the risks, and my complication rates. We also reviewed the expected weight loss, the rules necessary for gino-term success, the nutritional supplementation recommended for each operation, and the importance of incorporating excercise into the lifestyle to maintain the weight. We reviewed both the national history with each operation, my experience with each operation, the lack of long-term weight loss data with the vertical sleeve gastrectomy and the potential for exacerbating reflux with the vertical sleeve gastrectomy. In addition, we discussed " the risk of adhesions, internal hernias, iron and calcium deficiency, dumping syndrome and potential for gastrojejunal ulcer with the RYGB. We discussed that RYGB would be more reliable long term treatment for GERD.  She would like to proceed with RYGB.  We will obtain EGD prior to surgery.  Additionally, we discussed that OCP increases risk of perioperative VTE.  She is to avoid hormones for 6 weeks prior to and after surgery.  We also discussed that her fertility will improve with weight loss and recommendation is to avoid pregnancy for a minimum of 18 months after surgery and ensuring that her labs values are normal at time of conception.  We will obtain sleep study prior to surgery.       I spent 46 minutes in the professional and overall care of this patient.  36 minutes direct patient contact  10 minutes documentation    Chalino Mora MD

## 2024-02-21 ENCOUNTER — LAB (OUTPATIENT)
Dept: LAB | Facility: LAB | Age: 29
End: 2024-02-21
Payer: COMMERCIAL

## 2024-02-21 DIAGNOSIS — E53.8 B12 DEFICIENCY: ICD-10-CM

## 2024-02-21 DIAGNOSIS — E55.9 VITAMIN D DEFICIENCY: ICD-10-CM

## 2024-02-21 DIAGNOSIS — D68.9 COAGULATION DISORDER (MULTI): ICD-10-CM

## 2024-02-21 DIAGNOSIS — D50.8 IRON DEFICIENCY ANEMIA SECONDARY TO INADEQUATE DIETARY IRON INTAKE: ICD-10-CM

## 2024-02-21 DIAGNOSIS — E63.9 NUTRITIONAL DISORDER: ICD-10-CM

## 2024-02-21 DIAGNOSIS — G47.33 OBSTRUCTIVE SLEEP APNEA: ICD-10-CM

## 2024-02-21 DIAGNOSIS — Z71.3 ENCOUNTER FOR NUTRITION EVALUATION PRIOR TO BARIATRIC SURGERY: ICD-10-CM

## 2024-02-21 DIAGNOSIS — E61.0 COPPER DEFICIENCY: ICD-10-CM

## 2024-02-21 DIAGNOSIS — K21.9 GERD WITHOUT ESOPHAGITIS: ICD-10-CM

## 2024-02-21 DIAGNOSIS — Z01.818 PRE-OP EVALUATION: ICD-10-CM

## 2024-02-21 DIAGNOSIS — R73.9 HYPERGLYCEMIA: ICD-10-CM

## 2024-02-21 DIAGNOSIS — E51.9 THIAMINE DEFICIENCY: ICD-10-CM

## 2024-02-21 DIAGNOSIS — E07.9 DISEASE OF THYROID GLAND: ICD-10-CM

## 2024-02-21 LAB
25(OH)D3 SERPL-MCNC: 26 NG/ML (ref 30–100)
ALBUMIN SERPL BCP-MCNC: 3.7 G/DL (ref 3.4–5)
ALP SERPL-CCNC: 63 U/L (ref 33–110)
ALT SERPL W P-5'-P-CCNC: 20 U/L (ref 7–45)
ANION GAP SERPL CALC-SCNC: 11 MMOL/L (ref 10–20)
AST SERPL W P-5'-P-CCNC: 19 U/L (ref 9–39)
BASOPHILS # BLD AUTO: 0.06 X10*3/UL (ref 0–0.1)
BASOPHILS NFR BLD AUTO: 0.6 %
BILIRUB SERPL-MCNC: 0.3 MG/DL (ref 0–1.2)
BUN SERPL-MCNC: 12 MG/DL (ref 6–23)
CALCIUM SERPL-MCNC: 9 MG/DL (ref 8.6–10.3)
CHLORIDE SERPL-SCNC: 102 MMOL/L (ref 98–107)
CO2 SERPL-SCNC: 29 MMOL/L (ref 21–32)
CREAT SERPL-MCNC: 0.96 MG/DL (ref 0.5–1.05)
EGFRCR SERPLBLD CKD-EPI 2021: 84 ML/MIN/1.73M*2
EOSINOPHIL # BLD AUTO: 0.2 X10*3/UL (ref 0–0.7)
EOSINOPHIL NFR BLD AUTO: 2.1 %
ERYTHROCYTE [DISTWIDTH] IN BLOOD BY AUTOMATED COUNT: 13.1 % (ref 11.5–14.5)
EST. AVERAGE GLUCOSE BLD GHB EST-MCNC: 114 MG/DL
FERRITIN SERPL-MCNC: 45 NG/ML (ref 8–150)
FOLATE SERPL-MCNC: 14.9 NG/ML
GLUCOSE SERPL-MCNC: 108 MG/DL (ref 74–99)
HBA1C MFR BLD: 5.6 %
HCT VFR BLD AUTO: 42.9 % (ref 36–46)
HGB BLD-MCNC: 14.3 G/DL (ref 12–16)
IMM GRANULOCYTES # BLD AUTO: 0.03 X10*3/UL (ref 0–0.7)
IMM GRANULOCYTES NFR BLD AUTO: 0.3 % (ref 0–0.9)
IRON SATN MFR SERPL: 15 % (ref 25–45)
IRON SERPL-MCNC: 69 UG/DL (ref 35–150)
LYMPHOCYTES # BLD AUTO: 1.96 X10*3/UL (ref 1.2–4.8)
LYMPHOCYTES NFR BLD AUTO: 21 %
MCH RBC QN AUTO: 28.9 PG (ref 26–34)
MCHC RBC AUTO-ENTMCNC: 33.3 G/DL (ref 32–36)
MCV RBC AUTO: 87 FL (ref 80–100)
MONOCYTES # BLD AUTO: 0.35 X10*3/UL (ref 0.1–1)
MONOCYTES NFR BLD AUTO: 3.7 %
NEUTROPHILS # BLD AUTO: 6.75 X10*3/UL (ref 1.2–7.7)
NEUTROPHILS NFR BLD AUTO: 72.3 %
NRBC BLD-RTO: 0 /100 WBCS (ref 0–0)
PLATELET # BLD AUTO: 312 X10*3/UL (ref 150–450)
POTASSIUM SERPL-SCNC: 4.3 MMOL/L (ref 3.5–5.3)
PROT SERPL-MCNC: 7.2 G/DL (ref 6.4–8.2)
RBC # BLD AUTO: 4.94 X10*6/UL (ref 4–5.2)
SODIUM SERPL-SCNC: 138 MMOL/L (ref 136–145)
TIBC SERPL-MCNC: 460 UG/DL (ref 240–445)
TSH SERPL-ACNC: 1.42 MIU/L (ref 0.44–3.98)
UIBC SERPL-MCNC: 391 UG/DL (ref 110–370)
VIT B12 SERPL-MCNC: 294 PG/ML (ref 211–911)
WBC # BLD AUTO: 9.4 X10*3/UL (ref 4.4–11.3)

## 2024-02-21 PROCEDURE — 84446 ASSAY OF VITAMIN E: CPT

## 2024-02-21 PROCEDURE — 82728 ASSAY OF FERRITIN: CPT

## 2024-02-21 PROCEDURE — 80053 COMPREHEN METABOLIC PANEL: CPT

## 2024-02-21 PROCEDURE — 83036 HEMOGLOBIN GLYCOSYLATED A1C: CPT

## 2024-02-21 PROCEDURE — 83540 ASSAY OF IRON: CPT

## 2024-02-21 PROCEDURE — 82306 VITAMIN D 25 HYDROXY: CPT

## 2024-02-21 PROCEDURE — 84590 ASSAY OF VITAMIN A: CPT

## 2024-02-21 PROCEDURE — 82607 VITAMIN B-12: CPT

## 2024-02-21 PROCEDURE — 82525 ASSAY OF COPPER: CPT

## 2024-02-21 PROCEDURE — 84443 ASSAY THYROID STIM HORMONE: CPT

## 2024-02-21 PROCEDURE — 84630 ASSAY OF ZINC: CPT

## 2024-02-21 PROCEDURE — 36415 COLL VENOUS BLD VENIPUNCTURE: CPT

## 2024-02-21 PROCEDURE — 82746 ASSAY OF FOLIC ACID SERUM: CPT

## 2024-02-21 PROCEDURE — 83550 IRON BINDING TEST: CPT

## 2024-02-21 PROCEDURE — 83970 ASSAY OF PARATHORMONE: CPT

## 2024-02-21 PROCEDURE — 84425 ASSAY OF VITAMIN B-1: CPT

## 2024-02-21 PROCEDURE — 85025 COMPLETE CBC W/AUTO DIFF WBC: CPT

## 2024-02-21 PROCEDURE — 87449 NOS EACH ORGANISM AG IA: CPT

## 2024-02-21 PROCEDURE — 85610 PROTHROMBIN TIME: CPT

## 2024-02-21 PROCEDURE — 85730 THROMBOPLASTIN TIME PARTIAL: CPT

## 2024-02-22 LAB
APTT PPP: 26 SECONDS (ref 27–38)
INR PPP: 1 (ref 0.9–1.1)
PROTHROMBIN TIME: 11.2 SECONDS (ref 9.8–12.8)
PTH-INTACT SERPL-MCNC: 74 PG/ML (ref 18.5–88)

## 2024-02-22 RX ORDER — FERROUS SULFATE 325(65) MG
325 TABLET, DELAYED RELEASE (ENTERIC COATED) ORAL
Qty: 90 TABLET | Refills: 1 | Status: SHIPPED | OUTPATIENT
Start: 2024-02-22 | End: 2025-02-21

## 2024-02-22 RX ORDER — CHOLECALCIFEROL (VITAMIN D3) 50 MCG
50 TABLET ORAL DAILY
Qty: 90 TABLET | Refills: 1 | Status: SHIPPED | OUTPATIENT
Start: 2024-02-22 | End: 2025-02-21

## 2024-02-22 NOTE — RESULT ENCOUNTER NOTE
Labs okay except low vitamin D and iron, I sent supplements to her pharmacy, to take with a meal to improve absorption

## 2024-02-23 LAB
COPPER SERPL-MCNC: 243.8 UG/DL (ref 80–155)
ZINC SERPL-MCNC: 72 UG/DL (ref 60–120)

## 2024-02-24 LAB
A-TOCOPHEROL VIT E SERPL-MCNC: 13.1 MG/L (ref 5.5–18)
ANNOTATION COMMENT IMP: NORMAL
BETA+GAMMA TOCOPHEROL SERPL-MCNC: 3 MG/L (ref 0–6)
H PYLORI AG STL QL IA: NEGATIVE
RETINYL PALMITATE SERPL-MCNC: 0.03 MG/L (ref 0–0.1)
VIT A SERPL-MCNC: 0.78 MG/L (ref 0.3–1.2)

## 2024-02-26 LAB — VIT B1 PYROPHOSHATE BLD-SCNC: 109 NMOL/L (ref 70–180)

## 2024-02-28 RX ORDER — NALOXONE HYDROCHLORIDE 0.4 MG/ML
0.2 INJECTION, SOLUTION INTRAMUSCULAR; INTRAVENOUS; SUBCUTANEOUS EVERY 5 MIN PRN
Status: CANCELLED | OUTPATIENT
Start: 2024-02-28

## 2024-02-28 RX ORDER — ONDANSETRON HYDROCHLORIDE 2 MG/ML
4 INJECTION, SOLUTION INTRAVENOUS ONCE AS NEEDED
Status: CANCELLED | OUTPATIENT
Start: 2024-02-28

## 2024-02-28 RX ORDER — FLUMAZENIL 0.1 MG/ML
0.2 INJECTION INTRAVENOUS ONCE AS NEEDED
Status: CANCELLED | OUTPATIENT
Start: 2024-02-28

## 2024-03-01 ENCOUNTER — PRE-ADMISSION TESTING (OUTPATIENT)
Dept: PREADMISSION TESTING | Facility: HOSPITAL | Age: 29
End: 2024-03-01
Payer: COMMERCIAL

## 2024-03-01 VITALS
HEIGHT: 64 IN | TEMPERATURE: 98.6 F | OXYGEN SATURATION: 98 % | SYSTOLIC BLOOD PRESSURE: 117 MMHG | DIASTOLIC BLOOD PRESSURE: 82 MMHG | BODY MASS INDEX: 47.84 KG/M2 | HEART RATE: 79 BPM | WEIGHT: 280.2 LBS

## 2024-03-01 DIAGNOSIS — K21.9 GASTROESOPHAGEAL REFLUX DISEASE WITHOUT ESOPHAGITIS: Primary | ICD-10-CM

## 2024-03-01 PROCEDURE — 99203 OFFICE O/P NEW LOW 30 MIN: CPT | Performed by: PHYSICIAN ASSISTANT

## 2024-03-01 ASSESSMENT — DUKE ACTIVITY SCORE INDEX (DASI)
TOTAL_SCORE: 58.2
CAN YOU RUN A SHORT DISTANCE: YES
CAN YOU TAKE CARE OF YOURSELF (EAT, DRESS, BATHE, OR USE TOILET): YES
CAN YOU WALK INDOORS, SUCH AS AROUND YOUR HOUSE: YES
CAN YOU DO YARD WORK LIKE RAKING LEAVES, WEEDING OR PUSHING A MOWER: YES
CAN YOU DO LIGHT WORK AROUND THE HOUSE LIKE DUSTING OR WASHING DISHES: YES
CAN YOU DO HEAVY WORK AROUND THE HOUSE LIKE SCRUBBING FLOORS OR LIFTING AND MOVING HEAVY FURNITURE: YES
CAN YOU WALK A BLOCK OR TWO ON LEVEL GROUND: YES
CAN YOU PARTICIPATE IN STRENOUS SPORTS LIKE SWIMMING, SINGLES TENNIS, FOOTBALL, BASKETBALL, OR SKIING: YES
CAN YOU PARTICIPATE IN MODERATE RECREATIONAL ACTIVITIES LIKE GOLF, BOWLING, DANCING, DOUBLES TENNIS OR THROWING A BASEBALL OR FOOTBALL: YES
DASI METS SCORE: 9.9
CAN YOU DO MODERATE WORK AROUND THE HOUSE LIKE VACUUMING, SWEEPING FLOORS OR CARRYING GROCERIES: YES
CAN YOU HAVE SEXUAL RELATIONS: YES
CAN YOU CLIMB A FLIGHT OF STAIRS OR WALK UP A HILL: YES

## 2024-03-01 ASSESSMENT — CHADS2 SCORE
DIABETES: NO
CHADS2 SCORE: 0
CHF: NO
AGE GREATER THAN OR EQUAL TO 75: NO
AGE GREATER THAN OR EQUAL TO 75: NO
PRIOR STROKE OR TIA OR THROMBOEMBOLISM: NO
HYPERTENSION: NO

## 2024-03-01 ASSESSMENT — LIFESTYLE VARIABLES: SMOKING_STATUS: NONSMOKER

## 2024-03-01 NOTE — CPM/PAT H&P
CPM/PAT Evaluation       Name: Sindhu Herron (Sindhu Herron)  /Age: 1995/28 y.o.     In-Person       Chief Complaint: GERD    HPI 28-year-old female with history of GERD and morbid obesity.  Patient is interested in bariatric surgery.  Her BMI is 48.1.  She has history of anxiety/depression.  She denies nausea vomiting diarrhea constipation, fevers or chills.  Patient is scheduled for EGD 3/7/2024    Past Medical History:   Diagnosis Date    Anxiety disorder, unspecified     Foot pain, bilateral 2023    GERD (gastroesophageal reflux disease)     High cholesterol     Hypercholesterolemia     Ingrown toenail of both feet 2023       Past Surgical History:   Procedure Laterality Date    WIS[place] TOOTH EXTRACTION         Patient  reports being sexually active and has had partner(s) who are male. She reports using the following method of birth control/protection: OCP.    Family History   Problem Relation Name Age of Onset    Hypertension Mother      Hyperlipidemia Mother      Other (MORBID OBESITY) Mother      Diabetes Mother      Hypertension Father      Hyperlipidemia Father      Other (MORBID OBESITY) Father      Diabetes Father      Leukemia Other GRANDFATHER     Cancer Other GRANDMOTHER         RENAL/KIDNEY    Melanoma Other GRANDMOTHER        No Known Allergies    Current Outpatient Medications   Medication Instructions    cholecalciferol (VITAMIN D-3) 50 mcg, oral, Daily    escitalopram (Lexapro) 10 mg tablet 1 tablet, oral, Daily    ferrous sulfate 325 (65 Fe) MG EC tablet 1 tablet, oral, Daily with breakfast, Do not crush, chew, or split.    hydrOXYzine HCL (ATARAX) 10 mg, TAKES 20 MG    lamoTRIgine (LAMICTAL) 200 mg, oral, Daily    norgestrel-ethinyl estradioL (Low-Ogestrel, 28,) 0.3-30 mg-mcg tablet 1 tablet, oral, Daily    omeprazole (PRILOSEC) 40 mg, oral, Daily before breakfast, Do not crush or chew.    spironolactone (ALDACTONE) 100 mg, oral, Daily    tretinoin (Retin-A) 0.05 % cream  "Topical     Review of Systems   All other systems reviewed and are negative.       Physical Exam  Vitals reviewed. Physical exam within normal limits.   Constitutional:       Appearance: Normal appearance. She is obese.   HENT:      Head: Normocephalic and atraumatic.      Mouth/Throat:      Mouth: Mucous membranes are moist.   Eyes:      Extraocular Movements: Extraocular movements intact.      Pupils: Pupils are equal, round, and reactive to light.   Cardiovascular:      Rate and Rhythm: Normal rate and regular rhythm.      Pulses: Normal pulses.      Heart sounds: Normal heart sounds.   Pulmonary:      Effort: Pulmonary effort is normal.      Breath sounds: Normal breath sounds.   Abdominal:      General: Bowel sounds are normal.      Palpations: Abdomen is soft.   Musculoskeletal:         General: Normal range of motion.      Cervical back: Normal range of motion.   Lymphadenopathy:      Cervical: No cervical adenopathy.   Skin:     General: Skin is warm and dry.   Neurological:      General: No focal deficit present.      Mental Status: She is alert and oriented to person, place, and time.   Psychiatric:         Mood and Affect: Mood normal.         Behavior: Behavior normal.         Thought Content: Thought content normal.         Judgment: Judgment normal.          PAT AIRWAY:   Airway:     Mallampati::  I    Neck ROM::  Full      Vitals  Heart Rate:  [79]   Temp:  [37 °C (98.6 °F)]   BP: (117)/(82)   Height:  [162.6 cm (5' 4\")]   Weight:  [127 kg (280 lb 3.2 oz)]   SpO2:  [98 %]        DASI Risk Score      Flowsheet Row Most Recent Value   DASI SCORE 58.2   METS Score (Will be calculated only when all the questions are answered) 9.9          Caprini DVT Assessment      Flowsheet Row Most Recent Value   DVT Score 8   Current Status Minor surgery planned   Women Oral contraceptives   History Family history of DVT/PE   Age Less than 40 years   BMI 41-50 (Morbid obesity)          Modified Frailty Index    No " data to display       CHADS2 Stroke Risk  Current as of 15 minutes ago        N/A 3 - 100%: High Risk   2 - 3%: Medium Risk   0 - 2%: Low Risk     Last Change: N/A          This score determines the patient's risk of having a stroke if the patient has atrial fibrillation.        This score is not applicable to this patient. Components are not calculated.          Revised Cardiac Risk Index      Flowsheet Row Most Recent Value   Revised Cardiac Risk Calculator 0          Apfel Simplified Score      Flowsheet Row Most Recent Value   Apfel Simplified Score Calculator 2          Risk Analysis Index Results This Encounter    No data found in the last 1 encounters.       Stop Bang Score      Flowsheet Row Most Recent Value   Do you snore loudly? 1   Do you often feel tired or fatigued after your sleep? 1   Has anyone ever observed you stop breathing in your sleep? 1   Do you have or are you being treated for high blood pressure? 0   Recent BMI (Calculated) 48.1   Is BMI greater than 35 kg/m2? 1=Yes   Age older than 50 years old? 0=No   Is your neck circumference greater than 17 inches (Male) or 16 inches (Female)? 1   Gender - Male 0=No   STOP-BANG Total Score 5            Assessment and Plan:   1.  GERD   Plan: Esophagogastroduodenoscopy 03/607/2024  2.  BMI 48.1  3.  Anxiety/depression  4.  ASA 3       Caprini DVT score 8       Stop bang total score 5       CHADS2 score 0       DASI score 58.2       METS score 9.9       RCRI score 0       Apfel score 2  5.  Labs and EKG reviewed

## 2024-03-01 NOTE — PROGRESS NOTES
Medical Weight Loss Appointment (MWL 2)    Current Weight: 278 lb  Current BMI: 47.72    Current Diet: able to implement goals from last month.   Adherence: good per pt  Daily Intake:   Breakfast (@ 8am)- Chobani complete protein drink (provides 170kcal, 20 grams of protein, 12 grams of sugar) with fruit (grapes/berries) on side   Lunch (@12/1 pm) - OWYN protein shake, OR a Silvano Delgado breakfast sandwich (provides 18g of protein)  Dinner (@7/8 pm) - chicken, OR fish, OR lightly breaded chicken tenders   Snacks: baby carrots and ranch on occasion between lunch/dinner, a bag of portioned kettle chips, Zander Fru (chocolate covered berries) after dinner   Beverages: water  Alcohol intake: 1-2x/week, 1 drink per occasion.   Exercise: walking dogs everyday   Behavior/Diet Changes: Sindhu reports that she more mindful of nutrition labels and has been more mindful of fat intake. Sindhu has reduced her juice/soda intake and replaced it with water.     Today's Lesson: Reviewed patient's dietary changes and food recall. Provided snack handout and reviewed ways to overall reduce snacking and/or make better snack choices.   Diet Goals: Practice the lifelong rules  Exercise Recommendations: Gradually work up to 150 minutes of moderate intensity exercise per week.  Behavior Goals:   1. Reduce snacking and/or choose better snack alternatives as discussed.   2. Increase in time and/or speed of walking to ensure moderate intensity.     Plan: Will follow up next month. Will continue to monitor pt's weight, dietary & behavior changes. Sindhu reports her psych evaluation is scheduled for March 28.       Sagrario Mena, RD, LDN  Registered Dietitian, Licensed Dietitian Nutritionist

## 2024-03-01 NOTE — PREPROCEDURE INSTRUCTIONS
Medication List            Accurate as of March 1, 2024  8:41 AM. Always use your most recent med list.                cholecalciferol 50 MCG (2000 UT) tablet  Commonly known as: Vitamin D-3  Take 1 tablet (50 mcg) by mouth once daily.  Medication Adjustments for Surgery: Stop 7 days before surgery     escitalopram 10 mg tablet  Commonly known as: Lexapro  Medication Adjustments for Surgery: Take morning of surgery with sip of water, no other fluids     ferrous sulfate 325 (65 Fe) MG EC tablet  Take 1 tablet by mouth once daily with breakfast. Do not crush, chew, or split.  Medication Adjustments for Surgery: Stop 7 days before surgery     hydrOXYzine HCL 25 mg tablet  Commonly known as: Atarax  Medication Adjustments for Surgery: Continue until night before surgery     lamoTRIgine 200 mg tablet  Commonly known as: LaMICtal  Medication Adjustments for Surgery: Take morning of surgery with sip of water, no other fluids     Low-Ogestrel (28) 0.3-30 mg-mcg tablet  Generic drug: norgestrel-ethinyl estradioL  Take 1 tablet by mouth once daily.  Medication Adjustments for Surgery: Stop 7 days before surgery     omeprazole 40 mg DR capsule  Commonly known as: PriLOSEC  Take 1 capsule (40 mg) by mouth once daily in the morning. Take before meals. Do not crush or chew.  Medication Adjustments for Surgery: Take morning of surgery with sip of water, no other fluids     spironolactone 100 mg tablet  Commonly known as: Aldactone  Take 1 tablet (100 mg) by mouth once daily.  Medication Adjustments for Surgery: Take morning of surgery with sip of water, no other fluids     tretinoin 0.05 % cream  Commonly known as: Retin-A  Medication Adjustments for Surgery: Continue until night before surgery                              NPO Instructions:    Do not eat any food after midnight the night before your surgery/procedure.    Additional Instructions:     Review your medication instructions, take indicated medications  Wear   comfortable loose fitting clothingPAT DISCHARGE INSTRUCTIONS    Please call the Same Day Surgery (SDS) Department of the hospital where your procedure will be performed after 2:00 PM the day before your surgery. If you are scheduled on a Monday, or a Tuesday following a Monday holiday, you will need to call on the last business day prior to your surgery.    [facility]  [address] [place] [address], [address]  [phone]    [place] [id number] [place] [address] [phone]    [facility]  [address] [facility].  [place] [id number]  [phone]    Please let your surgeon know if:      You develop any open sores, shingles, burning or painful urination as these may increase your risk of an infection.   You no longer wish to have the surgery.   Any other personal circumstances change that may lead to the need to cancel or defer this surgery-such as being sick or getting admitted to any hospital within one week of your planned procedure.    Your contact details change, such as a change of address or phone number.    Starting now:     Please DO NOT drink alcohol or smoke for 24 hours before surgery. It is well known that quitting smoking can make a huge difference to your health and recovery from surgery. The longer you abstain from smoking, the better your chances of a healthy recovery. If you need help with quitting, call 1-800-QUIT-NOW to be connected to a trained counselor who will discuss the best methods to help you quit.     Before your surgery:    Please stop all supplements 7 days prior to surgery. Or as directed by your surgeon.   Please stop taking NSAID pain medicine such as Advil and Motrin 7 days before surgery.    If you develop any fever, cough, cold, rashes, cuts, scratches, scrapes, urinary symptoms or infection anywhere on your body (including teeth and gums) prior to surgery, please call  your surgeon’s office as soon as possible. This may require treatment to reduce the chance of cancellation on the day of surgery.    The day before your surgery:   DIET- Do not eat or drink anything after midnight the night before surgery, including mints, candy and gum.   Get a good night’s rest.  Use the special soap for bathing if you have been instructed to use one.    Scheduled surgery times may change and you will be notified if this occurs - please check your personal voicemail for any updates.     On the morning of surgery:   Wear comfortable, loose fitting clothes which open in the front. Please do not wear moisturizers, creams, lotions, makeup or perfume.    Please bring with you to surgery:   Photo ID and insurance card   Current list of medicines and allergies   Pacemaker/ Defibrillator/Heart stent cards   CPAP machine and mask    Slings/ splints/ crutches   A copy of your complete advanced directive/DHPOA.    Please do NOT bring with you to surgery:   All jewelry and valuables should be left at home.   Prosthetic devices such as contact lenses, hearing aids, dentures, eyelash extensions, hairpins and body piercings must be removed prior to going in to the surgical suite.    After outpatient surgery:   A responsible adult MUST accompany you at the time of discharge and stay with you for 24 hours after your surgery. You may NOT drive yourself home after surgery.    Do not drive, operate machinery, make critical decisions or do activities that require co-ordination or balance until after a night’s sleep.   Do not drink alcoholic beverages for 24 hours.   Instructions for resuming your medications will be provided by your surgeon.    CALL YOUR DOCTOR AFTER SURGERY IF YOU HAVE:     Chills and/or a fever of 101° F or higher.    Redness, swelling, pus or drainage from your surgical wound or a bad smell from the wound.    Lightheadedness, fainting or confusion.    Persistent vomiting (throwing up) and are not  able to eat or drink for 12 hours.    Three or more loose, watery bowel movements in 24 hours (diarrhea).   Difficulty or pain while urinating( after non-urological surgery)    Pain and swelling in your legs, especially if it is only on one side.    Difficulty breathing or are breathing faster than normal.    Any new concerning symptoms.

## 2024-03-05 ENCOUNTER — NUTRITION (OUTPATIENT)
Dept: SURGERY | Facility: CLINIC | Age: 29
End: 2024-03-05
Payer: COMMERCIAL

## 2024-03-05 ENCOUNTER — OFFICE VISIT (OUTPATIENT)
Dept: SURGERY | Facility: CLINIC | Age: 29
End: 2024-03-05
Payer: COMMERCIAL

## 2024-03-05 VITALS
WEIGHT: 278 LBS | HEART RATE: 74 BPM | RESPIRATION RATE: 16 BRPM | DIASTOLIC BLOOD PRESSURE: 79 MMHG | SYSTOLIC BLOOD PRESSURE: 117 MMHG | HEIGHT: 64 IN | BODY MASS INDEX: 47.46 KG/M2

## 2024-03-05 VITALS — BODY MASS INDEX: 47.72 KG/M2 | WEIGHT: 278 LBS

## 2024-03-05 DIAGNOSIS — K21.9 GERD WITHOUT ESOPHAGITIS: Primary | ICD-10-CM

## 2024-03-05 DIAGNOSIS — F41.9 ANXIETY: ICD-10-CM

## 2024-03-05 DIAGNOSIS — E66.01 CLASS 3 SEVERE OBESITY DUE TO EXCESS CALORIES WITHOUT SERIOUS COMORBIDITY IN ADULT, UNSPECIFIED BMI (MULTI): ICD-10-CM

## 2024-03-05 PROCEDURE — 99213 OFFICE O/P EST LOW 20 MIN: CPT | Performed by: INTERNAL MEDICINE

## 2024-03-05 PROCEDURE — 3008F BODY MASS INDEX DOCD: CPT | Performed by: INTERNAL MEDICINE

## 2024-03-05 PROCEDURE — 1036F TOBACCO NON-USER: CPT | Performed by: INTERNAL MEDICINE

## 2024-03-05 ASSESSMENT — ENCOUNTER SYMPTOMS
ROS GI COMMENTS: HEARTBURN
VOMITING: 0
HEMATURIA: 0
APPETITE CHANGE: 0
RHINORRHEA: 0
DIFFICULTY URINATING: 0
FEVER: 0
HEADACHES: 0
JOINT SWELLING: 0
PALPITATIONS: 0
DIZZINESS: 0
COUGH: 0
ABDOMINAL PAIN: 0
FREQUENCY: 0
SINUS PAIN: 0
LOSS OF SENSATION IN FEET: 0
NAUSEA: 0
SHORTNESS OF BREATH: 0
ARTHRALGIAS: 0
CHILLS: 0
DIARRHEA: 0
OCCASIONAL FEELINGS OF UNSTEADINESS: 0
DEPRESSION: 0
SLEEP DISTURBANCE: 0
WEAKNESS: 0
CONSTIPATION: 0
FATIGUE: 0
SORE THROAT: 0

## 2024-03-05 ASSESSMENT — PATIENT HEALTH QUESTIONNAIRE - PHQ9
SUM OF ALL RESPONSES TO PHQ9 QUESTIONS 1 AND 2: 0
1. LITTLE INTEREST OR PLEASURE IN DOING THINGS: NOT AT ALL
2. FEELING DOWN, DEPRESSED OR HOPELESS: NOT AT ALL

## 2024-03-05 ASSESSMENT — PAIN SCALES - GENERAL: PAINLEVEL: 0-NO PAIN

## 2024-03-05 NOTE — PROGRESS NOTES
"Subjective   Patient ID: Sindhu Herron is a 28 y.o. female who presents for [facility] visit 2. Currently has 3 meals a day, 20g protein with each meal. Eats greek yogurt for breakfast. For lunch, she has protein shake. She cooks at home for dinner, chicken or fish, peas or broccoli. Occ snacks on fruit when she is hungry. She does not count calories. Drinks only water. Does not drink sugary beverages. Regarding exercise, she walks her dogs for 30-40 minutes every day. Patient c/o snoring and heartburn. She has gained weight since her last sleep study.    Diagnostics Reviewed: 2/2024 EKG NSR. 7/2022 sleep study was normal.  Labs Reviewed: 2/2024 labs WNL         Review of Systems   Constitutional:  Negative for appetite change, chills, fatigue and fever.   HENT:  Negative for ear pain, rhinorrhea, sinus pain and sore throat.    Eyes:  Negative for visual disturbance.   Respiratory:  Negative for cough and shortness of breath.         Snoring   Cardiovascular:  Negative for chest pain and palpitations.   Gastrointestinal:  Negative for abdominal pain, constipation, diarrhea, nausea and vomiting.        Heartburn   Genitourinary:  Negative for difficulty urinating, frequency and hematuria.   Musculoskeletal:  Negative for arthralgias and joint swelling.   Skin:  Negative for rash.   Neurological:  Negative for dizziness, weakness and headaches.   Psychiatric/Behavioral:  Negative for sleep disturbance.         Anxiety (controlled)       Objective   /79   Pulse 74   Resp 16   Ht 1.626 m (5' 4\")   Wt 126 kg (278 lb)   BMI 47.72 kg/m²     Physical Exam  Constitutional:       General: She is not in acute distress.     Appearance: She is obese.   HENT:      Mouth/Throat:      Comments: Dentition WNL  Cardiovascular:      Rate and Rhythm: Normal rate and regular rhythm.      Heart sounds: Normal heart sounds.   Pulmonary:      Breath sounds: Normal breath sounds.   Abdominal:      Palpations: Abdomen is soft.      " Tenderness: There is no abdominal tenderness.   Musculoskeletal:         General: Normal range of motion.      Cervical back: No tenderness.      Right lower leg: No edema.      Left lower leg: No edema.   Lymphadenopathy:      Cervical: No cervical adenopathy.   Skin:     General: Skin is warm.      Findings: No erythema.   Neurological:      General: No focal deficit present.      Mental Status: She is alert and oriented to person, place, and time.      Gait: Gait is intact.   Psychiatric:         Mood and Affect: Mood normal.         Behavior: Behavior normal.         Assessment/Plan   There are no diagnoses linked to this encounter.      Scribe Attestation  By signing my name below, Reema PALACIOS, Scribandrea   attest that this documentation has been prepared under the direction and in the presence of Selena Valadez MD.

## 2024-03-07 ENCOUNTER — HOSPITAL ENCOUNTER (OUTPATIENT)
Dept: GASTROENTEROLOGY | Facility: HOSPITAL | Age: 29
Discharge: HOME | End: 2024-03-07
Payer: COMMERCIAL

## 2024-03-07 ENCOUNTER — ANESTHESIA EVENT (OUTPATIENT)
Dept: GASTROENTEROLOGY | Facility: HOSPITAL | Age: 29
End: 2024-03-07
Payer: COMMERCIAL

## 2024-03-07 ENCOUNTER — ANESTHESIA (OUTPATIENT)
Dept: GASTROENTEROLOGY | Facility: HOSPITAL | Age: 29
End: 2024-03-07
Payer: COMMERCIAL

## 2024-03-07 VITALS
RESPIRATION RATE: 18 BRPM | HEART RATE: 73 BPM | SYSTOLIC BLOOD PRESSURE: 121 MMHG | DIASTOLIC BLOOD PRESSURE: 75 MMHG | OXYGEN SATURATION: 99 % | TEMPERATURE: 97.2 F

## 2024-03-07 DIAGNOSIS — K21.9 GERD WITHOUT ESOPHAGITIS: ICD-10-CM

## 2024-03-07 PROBLEM — E66.01 MORBID OBESITY (MULTI): Status: ACTIVE | Noted: 2024-03-07

## 2024-03-07 LAB — PREGNANCY TEST URINE, POC: NEGATIVE

## 2024-03-07 PROCEDURE — 3700000001 HC GENERAL ANESTHESIA TIME - INITIAL BASE CHARGE

## 2024-03-07 PROCEDURE — 81025 URINE PREGNANCY TEST: CPT

## 2024-03-07 PROCEDURE — 7100000010 HC PHASE TWO TIME - EACH INCREMENTAL 1 MINUTE

## 2024-03-07 PROCEDURE — 43239 EGD BIOPSY SINGLE/MULTIPLE: CPT | Performed by: SURGERY

## 2024-03-07 PROCEDURE — A43239 PR EDG TRANSORAL BIOPSY SINGLE/MULTIPLE: Performed by: ANESTHESIOLOGY

## 2024-03-07 PROCEDURE — 7100000001 HC RECOVERY ROOM TIME - INITIAL BASE CHARGE

## 2024-03-07 PROCEDURE — 3700000002 HC GENERAL ANESTHESIA TIME - EACH INCREMENTAL 1 MINUTE

## 2024-03-07 PROCEDURE — 88305 TISSUE EXAM BY PATHOLOGIST: CPT | Mod: TC | Performed by: SURGERY

## 2024-03-07 PROCEDURE — 2500000005 HC RX 250 GENERAL PHARMACY W/O HCPCS: Performed by: NURSE ANESTHETIST, CERTIFIED REGISTERED

## 2024-03-07 PROCEDURE — A43239 PR EDG TRANSORAL BIOPSY SINGLE/MULTIPLE: Performed by: NURSE ANESTHETIST, CERTIFIED REGISTERED

## 2024-03-07 PROCEDURE — 7100000009 HC PHASE TWO TIME - INITIAL BASE CHARGE

## 2024-03-07 PROCEDURE — 2500000004 HC RX 250 GENERAL PHARMACY W/ HCPCS (ALT 636 FOR OP/ED): Performed by: NURSE ANESTHETIST, CERTIFIED REGISTERED

## 2024-03-07 PROCEDURE — 7100000002 HC RECOVERY ROOM TIME - EACH INCREMENTAL 1 MINUTE

## 2024-03-07 PROCEDURE — 2500000004 HC RX 250 GENERAL PHARMACY W/ HCPCS (ALT 636 FOR OP/ED)

## 2024-03-07 PROCEDURE — 88305 TISSUE EXAM BY PATHOLOGIST: CPT | Performed by: PATHOLOGY

## 2024-03-07 RX ORDER — OXYCODONE HYDROCHLORIDE 5 MG/1
5 TABLET ORAL EVERY 4 HOURS PRN
Status: DISCONTINUED | OUTPATIENT
Start: 2024-03-07 | End: 2024-03-08 | Stop reason: HOSPADM

## 2024-03-07 RX ORDER — SODIUM CHLORIDE, SODIUM LACTATE, POTASSIUM CHLORIDE, CALCIUM CHLORIDE 600; 310; 30; 20 MG/100ML; MG/100ML; MG/100ML; MG/100ML
100 INJECTION, SOLUTION INTRAVENOUS CONTINUOUS
Status: DISCONTINUED | OUTPATIENT
Start: 2024-03-07 | End: 2024-03-08 | Stop reason: HOSPADM

## 2024-03-07 RX ORDER — FENTANYL CITRATE 50 UG/ML
50 INJECTION, SOLUTION INTRAMUSCULAR; INTRAVENOUS EVERY 5 MIN PRN
Status: DISCONTINUED | OUTPATIENT
Start: 2024-03-07 | End: 2024-03-08 | Stop reason: HOSPADM

## 2024-03-07 RX ORDER — PROPOFOL 10 MG/ML
INJECTION, EMULSION INTRAVENOUS CONTINUOUS PRN
Status: DISCONTINUED | OUTPATIENT
Start: 2024-03-07 | End: 2024-03-07

## 2024-03-07 RX ORDER — SODIUM CHLORIDE, SODIUM LACTATE, POTASSIUM CHLORIDE, CALCIUM CHLORIDE 600; 310; 30; 20 MG/100ML; MG/100ML; MG/100ML; MG/100ML
20 INJECTION, SOLUTION INTRAVENOUS CONTINUOUS
Status: DISCONTINUED | OUTPATIENT
Start: 2024-03-07 | End: 2024-03-08 | Stop reason: HOSPADM

## 2024-03-07 RX ORDER — LIDOCAINE HYDROCHLORIDE 10 MG/ML
0.1 INJECTION INFILTRATION; PERINEURAL ONCE
Status: DISCONTINUED | OUTPATIENT
Start: 2024-03-07 | End: 2024-03-08 | Stop reason: HOSPADM

## 2024-03-07 RX ORDER — FENTANYL CITRATE 50 UG/ML
25 INJECTION, SOLUTION INTRAMUSCULAR; INTRAVENOUS EVERY 5 MIN PRN
Status: DISCONTINUED | OUTPATIENT
Start: 2024-03-07 | End: 2024-03-08 | Stop reason: HOSPADM

## 2024-03-07 RX ORDER — LABETALOL HYDROCHLORIDE 5 MG/ML
5 INJECTION, SOLUTION INTRAVENOUS ONCE AS NEEDED
Status: DISCONTINUED | OUTPATIENT
Start: 2024-03-07 | End: 2024-03-08 | Stop reason: HOSPADM

## 2024-03-07 RX ORDER — ONDANSETRON HYDROCHLORIDE 2 MG/ML
4 INJECTION, SOLUTION INTRAVENOUS ONCE AS NEEDED
Status: DISCONTINUED | OUTPATIENT
Start: 2024-03-07 | End: 2024-03-08 | Stop reason: HOSPADM

## 2024-03-07 RX ORDER — PROPOFOL 10 MG/ML
INJECTION, EMULSION INTRAVENOUS AS NEEDED
Status: DISCONTINUED | OUTPATIENT
Start: 2024-03-07 | End: 2024-03-07

## 2024-03-07 RX ORDER — MIDAZOLAM HYDROCHLORIDE 1 MG/ML
INJECTION, SOLUTION INTRAMUSCULAR; INTRAVENOUS AS NEEDED
Status: DISCONTINUED | OUTPATIENT
Start: 2024-03-07 | End: 2024-03-07

## 2024-03-07 RX ADMIN — MIDAZOLAM 2 MG: 1 INJECTION INTRAMUSCULAR; INTRAVENOUS at 08:31

## 2024-03-07 RX ADMIN — PROPOFOL 80 MG: 10 INJECTION, EMULSION INTRAVENOUS at 08:31

## 2024-03-07 RX ADMIN — SODIUM CHLORIDE, POTASSIUM CHLORIDE, SODIUM LACTATE AND CALCIUM CHLORIDE: 600; 310; 30; 20 INJECTION, SOLUTION INTRAVENOUS at 08:21

## 2024-03-07 RX ADMIN — PROPOFOL 100 MCG/KG/MIN: 10 INJECTION, EMULSION INTRAVENOUS at 08:30

## 2024-03-07 RX ADMIN — Medication 50 MG: at 08:31

## 2024-03-07 ASSESSMENT — PAIN SCALES - GENERAL
PAINLEVEL_OUTOF10: 0 - NO PAIN

## 2024-03-07 ASSESSMENT — PAIN - FUNCTIONAL ASSESSMENT
PAIN_FUNCTIONAL_ASSESSMENT: 0-10

## 2024-03-07 NOTE — POST-PROCEDURE NOTE
PT IN SDS.  BREATHING REGULAR AND NONLABORED.  TOLERATING WATER AND CRACKERS WITHOUT DIFFICULTY.  SPOUSE AT BEDSIDE.    1595 DR ROSENDA VILLALPANDO AT BEDSIDE.

## 2024-03-07 NOTE — ANESTHESIA PREPROCEDURE EVALUATION
Patient: Sindhu Herron    Procedure Information       Date/Time: 03/07/24 0830    Scheduled providers: Chalino Mora MD; Saul Miller MD; TONE Bryant    Procedure: EGD    Location: [facility]            Relevant Problems   Cardiovascular   (+) Hypercholesterolemia      Endocrine   (+) Morbid obesity (CMS/HCC)      Neuro/Psych   (+) Anxiety disorder   (+) Bipolar 2 disorder (CMS/HCC)       Clinical information reviewed:   Tobacco  Allergies  Meds   Med Hx  Surg Hx  OB Status  Fam Hx  Soc   Hx        NPO Detail:  NPO/Void Status  Carbohydrate Drink Given Prior to Surgery? : N  Date of Last Liquid: 03/06/24  Time of Last Liquid: 2000  Date of Last Solid: 03/06/24  Time of Last Solid: 2000  Last Intake Type: Clear fluids  Time of Last Void: 0700         Physical Exam    Airway  Mallampati: II  TM distance: >3 FB  Neck ROM: full     Cardiovascular    Dental    Pulmonary    Abdominal            Anesthesia Plan    History of general anesthesia?: yes  History of complications of general anesthesia?: no    ASA 3     MAC     intravenous induction   Use of blood products discussed with patient who.    Plan discussed with CRNA.

## 2024-03-07 NOTE — PERIOPERATIVE NURSING NOTE
Patient received to Pacu Pershing #5 from Endo. Anesthesia at bedside. Report received. Initial assessment complete. VSS on Cardiac Monitor. Continue to monitor.

## 2024-03-07 NOTE — DISCHARGE INSTRUCTIONS
Instructions  Patient Instructions after a EGD      The anesthetics, sedatives or narcotics which were given to you today will be acting in your body for the next 24 hours, so you might feel a little sleepy or groggy.  This feeling should slowly wear off. Carefully read and follow the instructions.      You received sedation today:  - Do not drive or operate any machinery or power tools of any kind.   - No alcoholic beverages today, not even beer or wine.  - Do not make any important decisions or sign any legal documents.  - No over the counter medications that contain alcohol or that may cause drowsiness.  - Do not make any important decisions or sign any legal documents.     While it is common to experience mild to moderate abdominal distention, gas, or belching after your procedure, if any of these symptoms occur following discharge from the GI Lab or within one week of having your procedure, call the [facility] to be advised whether a visit to your nearest Urgent Care or Emergency Department is indicated.  Take this paper with you if you go.      - If you develop an allergic reaction to the medications that were given during your procedure such as difficulty breathing, rash, hives, severe nausea, vomiting or lightheadedness.- If you experience chest pain, shortness of breath, severe abdominal pain, fevers and chills.     -If you develop signs and symptoms of bleeding such as blood in your spit, if your stools turn black, tarry, or bloody     - If you have not urinated within 8 hours following your procedure.- If your IV site becomes painful, red, inflamed, or looks infected.     If you received a biopsy/polypectomy/sphincterotomy the following instructions apply below:     - Do not use Aspirin containing products, non-steroidal medications or anti-coagulants for one week following your procedure. (Examples of these types of medications are: Advil, Arthrotec, Aleve, Coumadin, Ecotrin, Heparin,  Ibuprofen, Indocin, Motrin, Naprosyn, Nuprin, Plavix, Vioxx, and Voltarin, or their generic forms.  This list is not all-inclusive.  Check with your physician or pharmacist before resuming medications.)      - Eat a soft diet today.  Avoid foods that are poorly digested for the next 24 hours.  These foods would include: nuts, beans, lettuce, red meats, and fried foods.       - Start with liquids and advance your diet as tolerated, gradually work up to eating solids.      - Do not have a Barium Study or Enema for one week.     Your physician recommends the additional following instructions:     Upper GI endoscopy: Resume your previous diet, continue your medications, recommendation to repeat upper endoscopy in three months for follow up of Sterling's ablation. Return to normal activity tomorrow.         Nurse Signature                                                                        Date___________________                                                                            Patient/Responsible Party Signature                                        Date___________________

## 2024-03-07 NOTE — ANESTHESIA POSTPROCEDURE EVALUATION
Patient: Sindhu Herron    Procedure Summary       Date: 03/07/24 Room / Location: [facility]    Anesthesia Start: 0825 Anesthesia Stop: 0855    Procedure: EGD Diagnosis: GERD without esophagitis    Scheduled Providers: Chalino Mora MD; Saul Miller MD; TONE Bryant Responsible Provider: Saul Miller MD    Anesthesia Type: MAC ASA Status: 3            Anesthesia Type: MAC    Vitals Value Taken Time   /72 03/07/24 0916   Temp 36.2 °C (97.2 °F) 03/07/24 0915   Pulse 71 03/07/24 0919   Resp 10 03/07/24 0919   SpO2 97 % 03/07/24 0919   Vitals shown include unvalidated device data.    Anesthesia Post Evaluation    Patient location during evaluation: PACU  Patient participation: complete - patient participated  Level of consciousness: awake  Pain management: adequate  Airway patency: patent  Cardiovascular status: acceptable  Respiratory status: acceptable  Hydration status: acceptable  Postoperative Nausea and Vomiting: none        No notable events documented.

## 2024-03-08 ENCOUNTER — CLINICAL SUPPORT (OUTPATIENT)
Dept: SLEEP MEDICINE | Facility: HOSPITAL | Age: 29
End: 2024-03-08
Payer: COMMERCIAL

## 2024-03-08 DIAGNOSIS — G47.33 OBSTRUCTIVE SLEEP APNEA: ICD-10-CM

## 2024-03-08 LAB
LABORATORY COMMENT REPORT: NORMAL
PATH REPORT.FINAL DX SPEC: NORMAL
PATH REPORT.GROSS SPEC: NORMAL
PATH REPORT.RELEVANT HX SPEC: NORMAL
PATH REPORT.TOTAL CANCER: NORMAL

## 2024-03-08 PROCEDURE — 95806 SLEEP STUDY UNATT&RESP EFFT: CPT | Performed by: GENERAL PRACTICE

## 2024-03-28 ENCOUNTER — OFFICE VISIT (OUTPATIENT)
Dept: BEHAVIORAL HEALTH | Facility: CLINIC | Age: 29
End: 2024-03-28
Payer: COMMERCIAL

## 2024-03-28 VITALS — HEIGHT: 65 IN | WEIGHT: 278 LBS | BODY MASS INDEX: 46.32 KG/M2

## 2024-03-28 DIAGNOSIS — E66.01 MORBID OBESITY WITH BMI OF 45.0-49.9, ADULT (MULTI): ICD-10-CM

## 2024-03-28 DIAGNOSIS — F41.1 GENERALIZED ANXIETY DISORDER: ICD-10-CM

## 2024-03-28 DIAGNOSIS — F31.81 BIPOLAR 2 DISORDER (MULTI): ICD-10-CM

## 2024-03-28 DIAGNOSIS — F12.90: ICD-10-CM

## 2024-03-28 PROCEDURE — 3008F BODY MASS INDEX DOCD: CPT | Performed by: PSYCHOLOGIST

## 2024-03-28 PROCEDURE — 96130 PSYCL TST EVAL PHYS/QHP 1ST: CPT | Performed by: PSYCHOLOGIST

## 2024-03-28 PROCEDURE — 96136 PSYCL/NRPSYC TST PHY/QHP 1ST: CPT | Performed by: PSYCHOLOGIST

## 2024-03-28 PROCEDURE — 90791 PSYCH DIAGNOSTIC EVALUATION: CPT | Performed by: PSYCHOLOGIST

## 2024-03-28 ASSESSMENT — LIFESTYLE VARIABLES
AUDIT-C TOTAL SCORE: 3
HOW OFTEN DO YOU HAVE A DRINK CONTAINING ALCOHOL: 2-3 TIMES A WEEK
AUDIT-C TOTAL SCORE: 3
HOW MANY STANDARD DRINKS CONTAINING ALCOHOL DO YOU HAVE ON A TYPICAL DAY: 1 OR 2
HOW OFTEN DO YOU HAVE SIX OR MORE DRINKS ON ONE OCCASION: NEVER
HOW OFTEN DURING THE LAST YEAR HAVE YOU HAD A FEELING OF GUILT OR REMORSE AFTER DRINKING: NEVER
SKIP TO QUESTIONS 9-10: 1
AUDIT TOTAL SCORE: 0
HOW OFTEN DURING THE LAST YEAR HAVE YOU FAILED TO DO WHAT WAS NORMALLY EXPECTED FROM YOU BECAUSE OF DRINKING: NEVER
HAS A RELATIVE, FRIEND, DOCTOR, OR ANOTHER HEALTH PROFESSIONAL EXPRESSED CONCERN ABOUT YOUR DRINKING OR SUGGESTED YOU CUT DOWN: NO
HAVE YOU OR SOMEONE ELSE BEEN INJURED AS A RESULT OF YOUR DRINKING: NO
HOW OFTEN DURING THE LAST YEAR HAVE YOU FOUND THAT YOU WERE NOT ABLE TO STOP DRINKING ONCE YOU HAD STARTED: NEVER
AUDIT TOTAL SCORE: 3
HOW OFTEN DURING THE LAST YEAR HAVE YOU BEEN UNABLE TO REMEMBER WHAT HAPPENED THE NIGHT BEFORE BECAUSE YOU HAD BEEN DRINKING: NEVER
HOW OFTEN DURING THE LAST YEAR HAVE YOU NEEDED AN ALCOHOLIC DRINK FIRST THING IN THE MORNING TO GET YOURSELF GOING AFTER A NIGHT OF HEAVY DRINKING: NEVER

## 2024-03-28 ASSESSMENT — ANXIETY QUESTIONNAIRES
1. FEELING NERVOUS, ANXIOUS, OR ON EDGE: NOT AT ALL
7. FEELING AFRAID AS IF SOMETHING AWFUL MIGHT HAPPEN: NOT AT ALL
5. BEING SO RESTLESS THAT IT IS HARD TO SIT STILL: NOT AT ALL
2. NOT BEING ABLE TO STOP OR CONTROL WORRYING: NOT AT ALL
4. TROUBLE RELAXING: NOT AT ALL
3. WORRYING TOO MUCH ABOUT DIFFERENT THINGS: NOT AT ALL
6. BECOMING EASILY ANNOYED OR IRRITABLE: NOT AT ALL
GAD7 TOTAL SCORE: 0

## 2024-03-28 ASSESSMENT — PAIN SCALES - GENERAL: PAINLEVEL: 0-NO PAIN

## 2024-03-28 NOTE — PROGRESS NOTES
"  Time started: 335  Time ended: 450  Total time spent: 75 minutes  Visit type: in person   Other time spent: 30 minutes report writing (integrating data, scoring and interpreting psych assessments, and plan for clearance).     We discussed that the note will be visible and others healthcare practitioners will have access. The patient has consented to an unrestricted note.      Metabolic Bariatric Surgery: Behavioral Health Evaluation:   Referral: Dr. Mora  Chief Complaint: difficulty with weight loss and weight loss maintenance.      Brief Background:   Sindhu Herron   is a  28 y.o. year-old, marital,  and , female.  The patient was born and raised in the [place], [place] area and raised by her biological parents. The patient does not have children. The patient has one sibling. She lives in an with her spouse and her dogs and feels safe.   Employment: She is working at the [employer] in the [employer]. She is a . She is going to start working at [employer] in the [employer] in April 2024.    Education: Bachelor's of Arts in secondary education and English.   Learning disorders (reading or reading comprehension) No  Weight history:  The patient started having problems with excess weight when she was 12-13 after she started having her menstrual cycles.    Highest adult weight: 284-285  Lowest adult weight: 180 lbs. She was 21 years old.   Current weight: 278 lbs  Height: 5'5\"   Diets tried: weight watchers, dieting apps (Noom and My Fitness Pal), slim fast, nutritionist a couple of times, Mounjaro and Metformin.   Patient had the most success with Mounjaro and exercise, losing 15 lbs pounds.   She had adverse reactions to the Mounjaro.     Surgeon, Support system, timing, risks/benefits from a  perspective:   Dr. Mora is the patient's surgeon. They agreed to the gastric bypass procedure.  The medical risks and benefits were discussed " "with the patient's surgeon. Risks/benefits of the surgery from a behavioral health perspective were reviewed.   Support:   Her  will be the patient's support system after the surgery.   Family/friends supportive: yes  Family/friends have concerns that need to be addressed: denied   The timing for surgery:   The patient can take time off work: yes  Current legal issues: Denied  Psychosocial stressors: weight-related  Coping mechanisms include: reading and talking to her friends and . Going to therapy and taking medications.   Coping was rated as effective  Factors contributing to excess weight:   Genetics: yes, both parents. Brother during high school but not currently.   Medications: psychiatric medications and birth control  Medical conditions: bipolar disorder. Sleep disturbance.   Hormonal:   Weight changes after pregnancies: not applicable  Infertility treatments: denied  Any noticeable changes in weight or weight distribution during myrna-or post-menopause: not applicable.   Family history of obesity-related or other medical conditions: HTN, high cholesterol, and diabetes.     The patient eats more than intended or planned in response to: Happiness and stress.   Food weaknesses include: chips and crackers.     Factors that led to weight gain or weight regain: work-related stress from her first job and COVID. Stress eating. She turns to chips, and crackers and more beer and wine. Last time, she turned to food to cope with stress was a couple of months ago. She is trying to stick to the plan.   Procedure on her foot, so she was emotional.     Currently, how does your excess weight affect your life? Self-confidence is the biggest thing. She does not feel comfortable wearing certain clothes. She does not like how certain clothes feel. More difficult to go up and down the stairs and bend over.   The patient's motivation for surgery includes: \"I want to eliminate the most complications that may happen " during pregnancy or during labor. She wants to be healthy when she starts having children.   She is aware and agreeable not to get pregnant for 2 years post surgery. Education was provided.   Expected total body weight loss, 12-18 months postsurgery: 100 lbs.     Disordered eating History:   The patient denied a history of an eating disorder.     Current eating disorder assessment:  Compensatory behaviors: The patient had a history of or currently denied compensatory behaviors} to lose weight.    Binge Eating Disorder symptoms: Loss of Control over eating, eating more than usual, eating when not hungry, eating alone due to being embarrassed, mood changes in the evening and distress.   Last episode was at least 6 months ago. Frequency: 1x per week every couple of months. Episode triggered by something.   She was eating this way more regular during high school. She was coping with bipolar disorder at the time.   1x/week in high school.   Night Eating Syndrome: denied   Graze Eating Habits: she used to snack throughout the day but is trying not to now. She used to eat 3-4 snacks per day but has not eaten this way in a couple of months.   Sleep-Disordered eating: denied   Body image problems? Avoidance.   The patient has a remote history of mild binge eating disorder in high school. And other specified binge eating disorder, 6 months ago, due to very low frequency.     Adherence:  The patient is working with a  registered dietician in the [facility]. Her RD's name is Sagrario Beardck.   The patient's pre-surgery weight loss goal is: none.    The patient started the program in January 2024 and the patient's starting weight was 280lbs. The patient has lost 2lbs to date.   Current exercise habits: she walks her dogs daily.   Current eating habits:  The patient consumes 3 meals and 2 snacks per day:   Pop: denied  Sweets: one small sweet per day, such as a diet peanut butter cup (70  calorie).   Caffeine: denied   Water intake: 64 to 100 ounces.   Portion Sizes: she is practicing using this model.   The patient has not been prescribed a CPAP machine.  The patient takes her medications as prescribed.   The patient has made the following behavioral changes since starting the , surgical weight loss program.  Please see above.   Mental health history:     Sindhu was diagnosed with bipolar type 2 disorder in 2017. She has also been diagnosed with anxiety. She has a history of panic attacks. Her last panic attack was more than 1 year ago.  She has been diagnosed with skin picking, excoriation and the last time was a couple of months ago. Her medication is helping. She denied a history social anxiety, disorder of thought, personality disorder,  PTSD, ADD.    Currently, the patient is being treated for mental health conditions via medication management and therapy.      Hospitalizations for mental health: denied  Suicide attempts: denied.    Self-injurious behaviors: If yes, in 2016 before she was diagnosed and treated.   Insomnia: denied  History of problems with impulse control: denied   Cognitive (memory problems and/or forgetfulness): yes, but it does not impair her functioning.   Any mental health problems related to past surgeries? Denied     Substance, tobacco, and alcohol use history:  History of alcohol use disorder, substance use disorder, dependence on prescription medications, tobacco dependence: yes/denied  Treatment programs for substance and/or alcohol use: N/A  Current alcohol habits: please see summary  Current Tobacco use habits: never been a smoker  Current Cannabis use: denied edibles, denied medical marijuana, denied street marijuana, denied vaping, or denied hookah, and denied cannabis oil pens.   She is using CBD products, once per week for relaxation.   Other drug use: denied     Mental Status Exam:  Sindhu was professionally dressed and neatly groomed. She was pleasant  "throughout the evaluation. Her mood was reported as good. She stated that she feels mentally stable. Her affect was observed as anxious. She denied suicidal ideation or plan. Her judgment and decision making appeared good. Her tone of voice and rate of speech were WNL.     Summary:   Sindhu is a 28-year-old  woman who presents today with a history of obesity with comorbid medical conditions and difficulty with weight loss. The purpose of this evaluation was to conduct a behavioral health evaluation for metabolic bariatric surgery to determine if she is an appropriate candidate for weight loss surgery from a behavioral health perspective.    Eating Habits Checklist = 8. This score falls in the range of minimal binge eating habits. Notable responses include: \"I do get self-conscious about my appearance and weight which makes me feel disappointed in myself. Sometimes when I eat a “forbidden food” on a diet, I feel like I “blew it” and eat even more. At least some of the time, I feel my thoughts are pre-occupied with trying to control my eating urges.\"    Alcohol Use Identification Test-C (AUDIT-C) = 3. She is at a low risk for hazardous drinking. She does not meet criteria for alcohol dependence. She consumes 1 glass of wine or a beer 2 times per week.   Generalized anxiety disorder questionnaire-7 (MAXIMO-7) = 0. This is a negative screen for anxiety and likely medication controlled and controlled via coping skills.   Patient Health Questionnaire -9 (PHQ-9) = 0. This is a negative screen for depression and likely medication controlled and controlled via coping skills.       Behavioral Health plan for clearance for metabolic bariatric surgery:     Sindhu has a remote history of mild binge eating disorder in high school. And other specified binge eating disorder, 6 months ago, due to very low frequency.   She did not meet criteria for any other eating disorders and is controlling the binge eating.   Sindhu did " not meet criteria for problematic alcohol use. She denied a history of an alcohol use, tobacco use, or drug dependence. She denied the current use of tobacco, cannabis products, or illicit substances. She is using CBD to treat her anxiety. She stated it is for relaxation.     Sindhu reported that she is being treated for bipolar disorder and anxiety via medication management and therapy. Results from the evaluation and psychological assessments indicated that her depression and generalized anxiety disorder assessments were negative. In Sindhu's case her symptoms may be medication controlled and controlled via coping skills. She denied a history of suicide attempts, hospitalizations for mental health reasons, or self-injurious behaviors.     Sindhu has an adequate support system. She identified her  as the primary support person, post-surgery. She reported weight related stress and effective coping skills, which will help her to cope with the post-surgery recommendations. The timing for surgery is good. She is motivated for weight loss surgery. She has made several changes to her dietary habits.     Taken together, a tox screen will be order for cannabis and we will request that her prescriber and/or therapist complete the psychiatric support letter. She will be cleared if the tox screen comes back negative and it the support letter is completed with no contraindications for surgery.

## 2024-03-29 NOTE — PROGRESS NOTES
Medical Weight Loss Appointment ([facility] 3)    Starting wt: 275 lb  Current Weight: 271 lb / This reflects a 7 lb wt loss this past month.  Current BMI: 45.10     Current Diet: practicing the lifelong rules   Adherence: good.  Daily Intake: 3 meals, high in protein  Breakfast- a Premier Protein shake and an Oikos greek yogurt   Lunch- Silvano Delgado complete meal, OR a can of tuna with seasonings and 6 triscuit crackers   Dinner- salmon and broccoli, OR chicken and broccoli, OR an 'open face' hamburger   Snacks: a Premier protein bar, a cheese stick, fruit   Beverages: Diet pop, 1-2 sparkling combs/day  Exercise: waking dogs everyday (~1/4 mile) plus an additional 2 long walks a week (~1 mile).   Behavior/Diet Changes: Sindhu reports that she is tracking her calories via the [employer] It jus and avg 1200 total kcal per day. Sindhu reports she also decreased her overall alcohol intake this past month.    Estimated ability to achieve goals: good.     Today's Lesson: Reviewed patient's dietary changes and food recall.   Diet Goals: Practice the lifelong rules  Exercise Recommendations: Gradually work up to 150 minutes of moderate intensity exercise per week.  Behavior Goals:   1. Continue to follow the lifelong rules   2. Maintain wt loss     Plan: Provided dietary clearance at today's appointment.  Pt will follow up next month for a weight check.           Sagrario Mena, YAMILET, LDN  Registered Dietitian, Licensed Dietitian Nutritionist

## 2024-04-03 ENCOUNTER — OFFICE VISIT (OUTPATIENT)
Dept: SURGERY | Facility: CLINIC | Age: 29
End: 2024-04-03
Payer: COMMERCIAL

## 2024-04-03 ENCOUNTER — NUTRITION (OUTPATIENT)
Dept: SURGERY | Facility: CLINIC | Age: 29
End: 2024-04-03
Payer: COMMERCIAL

## 2024-04-03 VITALS
WEIGHT: 271 LBS | HEIGHT: 65 IN | DIASTOLIC BLOOD PRESSURE: 75 MMHG | BODY MASS INDEX: 45.15 KG/M2 | RESPIRATION RATE: 16 BRPM | HEART RATE: 78 BPM | SYSTOLIC BLOOD PRESSURE: 127 MMHG

## 2024-04-03 VITALS — WEIGHT: 271 LBS | BODY MASS INDEX: 45.1 KG/M2

## 2024-04-03 DIAGNOSIS — G47.33 OBSTRUCTIVE SLEEP APNEA: Primary | ICD-10-CM

## 2024-04-03 DIAGNOSIS — K21.9 GERD WITHOUT ESOPHAGITIS: ICD-10-CM

## 2024-04-03 DIAGNOSIS — E66.01 MORBID OBESITY WITH BMI OF 45.0-49.9, ADULT (MULTI): ICD-10-CM

## 2024-04-03 PROCEDURE — 99213 OFFICE O/P EST LOW 20 MIN: CPT | Performed by: INTERNAL MEDICINE

## 2024-04-03 PROCEDURE — 3008F BODY MASS INDEX DOCD: CPT | Performed by: INTERNAL MEDICINE

## 2024-04-03 ASSESSMENT — ENCOUNTER SYMPTOMS
FREQUENCY: 0
PALPITATIONS: 0
WEAKNESS: 0
SHORTNESS OF BREATH: 0
APPETITE CHANGE: 0
ABDOMINAL PAIN: 0
VOMITING: 0
HEADACHES: 0
LOSS OF SENSATION IN FEET: 0
SINUS PAIN: 0
FEVER: 0
DIFFICULTY URINATING: 0
JOINT SWELLING: 0
HEMATURIA: 0
NAUSEA: 0
RHINORRHEA: 0
COUGH: 0
ARTHRALGIAS: 0
DIZZINESS: 0
FATIGUE: 0
SLEEP DISTURBANCE: 0
CONSTIPATION: 0
ROS GI COMMENTS: HEARTBURN
CHILLS: 0
SORE THROAT: 0
BACK PAIN: 0
OCCASIONAL FEELINGS OF UNSTEADINESS: 0
DEPRESSION: 0
DIARRHEA: 0

## 2024-04-03 ASSESSMENT — PATIENT HEALTH QUESTIONNAIRE - PHQ9
1. LITTLE INTEREST OR PLEASURE IN DOING THINGS: NOT AT ALL
2. FEELING DOWN, DEPRESSED OR HOPELESS: NOT AT ALL
SUM OF ALL RESPONSES TO PHQ9 QUESTIONS 1 AND 2: 0

## 2024-04-03 ASSESSMENT — PAIN SCALES - GENERAL: PAINLEVEL: 0-NO PAIN

## 2024-04-03 NOTE — PROGRESS NOTES
"Subjective   Patient ID: Sindhu Herron is a 28 y.o. female who presents for [facility] visit 3. Currently has 3 meals a day, 20g protein with each meal. Occ snacks on protein bar or string cheese when she is hungry. Drinks only water or sparkling water. Counts calories and averages 1200 a day. Regarding exercise, she walks her dogs twice a day and takes long mile-walks twice a week. Patient c/o heartburn.    Diagnostics Reviewed: 2/2024 EKG NSR. 4/2024 sleep study showed severe sleep apnea.  Labs Reviewed: 2/2024 labs WNL except low iron, low vit D.         Review of Systems   Constitutional:  Negative for appetite change, chills, fatigue and fever.        Excessive daytime somnolence   HENT:  Negative for dental problem, ear pain, rhinorrhea, sinus pain and sore throat.    Eyes:  Negative for visual disturbance.   Respiratory:  Negative for cough and shortness of breath.    Cardiovascular:  Negative for chest pain and palpitations.   Gastrointestinal:  Negative for abdominal pain, constipation, diarrhea, nausea and vomiting.        Heartburn   Genitourinary:  Negative for difficulty urinating, frequency and hematuria.   Musculoskeletal:  Negative for arthralgias, back pain and joint swelling.   Skin:  Negative for rash.   Neurological:  Negative for dizziness, weakness and headaches.   Psychiatric/Behavioral:  Negative for sleep disturbance.         Anxiety (controlled)       Objective   /75   Pulse 78   Resp 16   Ht 1.651 m (5' 5\")   Wt 123 kg (271 lb)   LMP  (LMP Unknown) Comment: pt cant remember because she does not get periods due to birth control  BMI 45.10 kg/m²     Physical Exam  Constitutional:       Appearance: She is obese.   HENT:      Mouth/Throat:      Comments: Dentition ok  Cardiovascular:      Rate and Rhythm: Normal rate and regular rhythm.   Pulmonary:      Breath sounds: Normal breath sounds.   Abdominal:      General: Bowel sounds are normal.      Palpations: Abdomen is soft. "   Musculoskeletal:         General: No swelling.   Neurological:      Mental Status: She is alert.         Assessment/Plan   Diagnoses and all orders for this visit:  Obstructive sleep apnea  Morbid obesity with BMI of 45.0-49.9, adult (CMS/[facility])        Scribe Attestation  By signing my name below, Reema PALACIOS, Scribe   attest that this documentation has been prepared under the direction and in the presence of Selena Valadez MD.

## 2024-04-17 ENCOUNTER — TELEPHONE (OUTPATIENT)
Dept: SURGERY | Facility: CLINIC | Age: 29
End: 2024-04-17
Payer: COMMERCIAL

## 2024-04-29 NOTE — PROGRESS NOTES
Weight Check   Virtual appt     Current Weight: 272 lb / This reflects a 3 lb wt loss.  Current BMI: 45.26    Sindhu reports that she is continuing to maintain the same habits both dietary and exercise wise. She reports that she started a new job which requires more walking. Sindhu's weight today reflects a 3 lb wt loss x 1 month.       Sagrario Mena RD, LDN  Registered Dietitian, Licensed Dietitian Nutritionist

## 2024-05-03 ENCOUNTER — NUTRITION (OUTPATIENT)
Dept: SURGERY | Facility: CLINIC | Age: 29
End: 2024-05-03
Payer: COMMERCIAL

## 2024-05-03 VITALS — BODY MASS INDEX: 45.26 KG/M2 | WEIGHT: 272 LBS

## 2024-07-05 DIAGNOSIS — Z30.011 ENCOUNTER FOR INITIAL PRESCRIPTION OF CONTRACEPTIVE PILLS: ICD-10-CM

## 2024-07-05 RX ORDER — NORGESTREL AND ETHINYL ESTRADIOL 0.3-0.03MG
1 KIT ORAL DAILY
Qty: 28 TABLET | Refills: 0 | OUTPATIENT
Start: 2024-07-05

## 2024-07-07 ENCOUNTER — PATIENT MESSAGE (OUTPATIENT)
Dept: OBSTETRICS AND GYNECOLOGY | Facility: CLINIC | Age: 29
End: 2024-07-07
Payer: COMMERCIAL

## 2024-07-07 DIAGNOSIS — Z30.011 ENCOUNTER FOR INITIAL PRESCRIPTION OF CONTRACEPTIVE PILLS: ICD-10-CM

## 2024-07-08 ENCOUNTER — LAB (OUTPATIENT)
Dept: LAB | Facility: LAB | Age: 29
End: 2024-07-08
Payer: COMMERCIAL

## 2024-07-08 ENCOUNTER — TELEPHONE (OUTPATIENT)
Dept: SURGERY | Facility: CLINIC | Age: 29
End: 2024-07-08
Payer: COMMERCIAL

## 2024-07-08 DIAGNOSIS — E66.01 MORBID OBESITY WITH BMI OF 45.0-49.9, ADULT (MULTI): ICD-10-CM

## 2024-07-08 DIAGNOSIS — F31.81 BIPOLAR 2 DISORDER (MULTI): ICD-10-CM

## 2024-07-08 DIAGNOSIS — F12.90: ICD-10-CM

## 2024-07-08 DIAGNOSIS — F41.1 GENERALIZED ANXIETY DISORDER: ICD-10-CM

## 2024-07-08 PROCEDURE — 80349 CANNABINOIDS NATURAL: CPT

## 2024-07-08 RX ORDER — NORGESTREL AND ETHINYL ESTRADIOL 0.3-0.03MG
1 KIT ORAL DAILY
Qty: 84 TABLET | Refills: 3 | Status: SHIPPED | OUTPATIENT
Start: 2024-07-08

## 2024-07-12 LAB — CARBOXYTHC UR-MCNC: <15 NG/ML

## 2024-07-14 ENCOUNTER — PATIENT MESSAGE (OUTPATIENT)
Dept: OBSTETRICS AND GYNECOLOGY | Facility: CLINIC | Age: 29
End: 2024-07-14
Payer: COMMERCIAL

## 2024-07-16 NOTE — PROGRESS NOTES
Hello     Thank  you for forwarding this email. I will contact Ms. Herron today about her clearance.

## 2024-07-25 ENCOUNTER — DOCUMENTATION (OUTPATIENT)
Dept: BEHAVIORAL HEALTH | Facility: CLINIC | Age: 29
End: 2024-07-25
Payer: COMMERCIAL

## 2024-07-25 NOTE — LETTER
DATE: 2024    RE: Clearance for Sindhu Herron for bariatric surgery. : 1995    Dear Dr. Abby PALACIOS conducted a behavioral health evaluation for bariatric surgery with Sindhu Herron on 2024.   Sindhu has a remote history of mild binge eating disorder in high school. Results from the evaluation showed that her last binge eating episode was 2023. She did not meet criteria for any other eating disorders.     Sindhu did not meet criteria for problematic alcohol use. She denied a history of an alcohol use, tobacco use, or drug dependence. She denied the current use of tobacco, cannabis products, or illicit substances. She was using CBD to treat her anxiety. Her lab result for THC marijuana, urine confirmation was negative on 2024.      Sindhu reported that she is being treated for depression and anxiety via medication management and therapy. Results from the evaluation and psychological assessments indicated that her depression and generalized anxiety disorder assessments were negative. In Sindhu's case her symptoms may be controlled via medication management and coping skills. Per Sindhu's mental health care practitioner, she is being treated for anxiety and depression; her symptoms are well controlled, and she has been adhering to treatment over the past year. Sindhu denied a history of suicide attempts, hospitalizations for mental health reasons, or self-injurious behaviors.       Sindhu has an adequate support system. She identified her  as the primary support person, post-surgery. She reported weight related stress and effective coping skills, which will help her to cope with the post-surgery recommendations. The timing for surgery is good. She is motivated for weight loss surgery. She has made several changes to her dietary habits.      Taken together, Sindhu is cleared for bariatric surgery from a behavioral health perspective. There are no known  contraindications from a behavioral health perspective. However, we reserve the right to change our opinion based upon additional information.     Sincerely,     Lakeisha Fatima, PhD   Psychologist  Director of Behavioral Health Services for Bariatric Surgery and Weight Management

## 2024-08-12 DIAGNOSIS — E66.01 MORBID OBESITY (MULTI): ICD-10-CM

## 2024-08-26 DIAGNOSIS — L70.0 CYSTIC ACNE: ICD-10-CM

## 2024-08-26 RX ORDER — SPIRONOLACTONE 100 MG/1
100 TABLET, FILM COATED ORAL DAILY
Qty: 90 TABLET | Refills: 3 | Status: SHIPPED | OUTPATIENT
Start: 2024-08-26

## 2024-09-18 ENCOUNTER — APPOINTMENT (OUTPATIENT)
Dept: SURGERY | Facility: CLINIC | Age: 29
End: 2024-09-18
Payer: COMMERCIAL

## 2024-09-18 VITALS — HEIGHT: 65 IN | BODY MASS INDEX: 48.15 KG/M2 | WEIGHT: 289 LBS

## 2024-09-18 VITALS — WEIGHT: 289 LBS | HEIGHT: 65 IN | BODY MASS INDEX: 48.15 KG/M2 | RESPIRATION RATE: 16 BRPM

## 2024-09-18 DIAGNOSIS — E66.01 MORBID OBESITY WITH BMI OF 45.0-49.9, ADULT (MULTI): ICD-10-CM

## 2024-09-18 DIAGNOSIS — Z01.818 PRE-OP EVALUATION: ICD-10-CM

## 2024-09-18 DIAGNOSIS — Z71.89 ENCOUNTER FOR PRE-BARIATRIC SURGERY COUNSELING AND EDUCATION: Primary | ICD-10-CM

## 2024-09-18 DIAGNOSIS — G47.33 OBSTRUCTIVE SLEEP APNEA: Primary | ICD-10-CM

## 2024-09-18 DIAGNOSIS — K21.9 GERD WITHOUT ESOPHAGITIS: ICD-10-CM

## 2024-09-18 PROCEDURE — 99214 OFFICE O/P EST MOD 30 MIN: CPT | Performed by: INTERNAL MEDICINE

## 2024-09-18 PROCEDURE — 1036F TOBACCO NON-USER: CPT

## 2024-09-18 PROCEDURE — 3008F BODY MASS INDEX DOCD: CPT

## 2024-09-18 PROCEDURE — 3008F BODY MASS INDEX DOCD: CPT | Performed by: INTERNAL MEDICINE

## 2024-09-18 PROCEDURE — 93000 ELECTROCARDIOGRAM COMPLETE: CPT | Performed by: INTERNAL MEDICINE

## 2024-09-18 PROCEDURE — 99024 POSTOP FOLLOW-UP VISIT: CPT

## 2024-09-18 ASSESSMENT — PATIENT HEALTH QUESTIONNAIRE - PHQ9
1. LITTLE INTEREST OR PLEASURE IN DOING THINGS: NOT AT ALL
SUM OF ALL RESPONSES TO PHQ9 QUESTIONS 1 AND 2: 0
2. FEELING DOWN, DEPRESSED OR HOPELESS: NOT AT ALL

## 2024-09-18 ASSESSMENT — ENCOUNTER SYMPTOMS
DIZZINESS: 0
ABDOMINAL PAIN: 0
PALPITATIONS: 0
NAUSEA: 0
CONSTIPATION: 0
DIARRHEA: 0
SHORTNESS OF BREATH: 0
DEPRESSION: 0
ARTHRALGIAS: 0
LOSS OF SENSATION IN FEET: 0
OCCASIONAL FEELINGS OF UNSTEADINESS: 0
COUGH: 0

## 2024-09-18 ASSESSMENT — PAIN SCALES - GENERAL: PAINLEVEL: 0-NO PAIN

## 2024-09-18 NOTE — PROGRESS NOTES
"Subjective   Patient ID: Sindhu Herron is a 29 y.o. female who presents for pre-op clearance.     Currently has 3 meals a day, 20g protein with each meal. Occ snacks on protein bar or string cheese when she is hungry. Drinks only water. Counts calories and averages 1200 a day. Regarding exercise, she walks her dogs twice a day and takes long mile-walks twice a week. Patient c/o heartburn. Is now taking birth control and isn't experiencing any periods. Takes vitamin D supplements. Now has a CPAP, uses it nightly and tolerates it well.     Diagnostics Reviewed: 2/2024 EKG NSR. 4/2024 sleep study showed severe sleep apnea.      9/18/2024 EKG NSR  Labs Reviewed: 2/2024 labs WNL except low iron, low vit D.               Review of Systems   Respiratory:  Negative for cough and shortness of breath.    Cardiovascular:  Negative for chest pain and palpitations.   Gastrointestinal:  Negative for abdominal pain, constipation, diarrhea and nausea.   Musculoskeletal:  Negative for arthralgias.   Neurological:  Negative for dizziness.       Objective   Resp 16   Ht 1.657 m (5' 5.25\")   Wt 131 kg (289 lb)   BMI 47.72 kg/m²     Physical Exam  Cardiovascular:      Rate and Rhythm: Normal rate and regular rhythm.      Heart sounds: Normal heart sounds.   Pulmonary:      Breath sounds: Normal breath sounds.   Abdominal:      Palpations: Abdomen is soft. There is no hepatomegaly.      Tenderness: There is no abdominal tenderness.   Musculoskeletal:      Right lower leg: No edema.      Left lower leg: No edema.   Neurological:      Mental Status: She is alert.      Gait: Gait normal.         Assessment/Plan   Diagnoses and all orders for this visit:  Obstructive sleep apnea  Pre-op evaluation  -     ECG 12 lead (Clinic Performed)  -     CBC and Auto Differential; Future  -     Comprehensive Metabolic Panel; Future  Morbid obesity with BMI of 45.0-49.9, adult (Multi)  GERD without esophagitis  She is medically clear for surgery " pending lab results    Scribe Attestation  By signing my name below, I, Alo Chamorro   attest that this documentation has been prepared under the direction and in the presence of Selena Valadez MD.

## 2024-09-18 NOTE — PROGRESS NOTES
Pre-Operative Dietary Education     Current Weight: 289 lbs  Current BMI: 47.72    In class settings, reviewed thin liquids diet that patient will be required to follow for 2 weeks after surgery. Reviewed low calorie fluids along with protein shakes and products that can be consumed. Emphasized the importance of following diet guidelines and recommendations after surgery to prevent complications. Addressed liquids and items to avoid at this time. Patients are educated to drink at least 50 oz of fluid per day, and gradually take in 50g protein per day. Briefly reviewed the diet progression for the next 3-4 months, which will also be discussed at each follow up appointment after surgery. Also reviewed supplementation after bariatric surgery. Patient was instructed to take chewable MVI with iron once daily for the first 6 weeks after surgery. Other supplementation will be introduced at 6 week follow up appointment.     I also addressed Sindhu's 17 lb wt gain since last appt. As discussed with Sindhu, she will follow a LCD until surgery, consisting of a Premier Protein shake, a Premier Protein bar and a Healthy Choice frozen meal daily. I also emphasized the importance of hydration (at least 64 fl oz daily) from sugar free beverages. Sindhu will follow up in 1 week for a weight check.       Sagrario Mena RD, LDN  Registered Dietitian, Licensed Dietitian Nutritionist

## 2024-09-26 ENCOUNTER — APPOINTMENT (OUTPATIENT)
Dept: SURGERY | Facility: CLINIC | Age: 29
End: 2024-09-26
Payer: COMMERCIAL

## 2024-09-26 NOTE — PROGRESS NOTES
Weight Check - Dietary Follow Up     Current Weight: 280 lbs / 9 lb weight loss from last appointment   Current BMI: 47.72     Sindhu has been following a very low calorie diet as instructed, before her Bariatric surgery scheduled for 10/7/2024. She is having the following listed below:    Breakfast - premier shake    Lunch - Premier protein bar   Dinner - Healthy Choice steamer  Non calorie fluids to stay hydrated     Sindhu reports that she is feeling fine and energy levels are okay.     Plan:   - Continue to follow very low calorie diet until surgery   - 2# weight loss per week. Avoid weight gain before surgery.     Yumiko Chand, MS, RD, LD

## 2024-10-07 ENCOUNTER — HOSPITAL ENCOUNTER (INPATIENT)
Facility: HOSPITAL | Age: 29
LOS: 1 days | Discharge: HOME | DRG: 620 | End: 2024-10-08
Attending: SURGERY | Admitting: SURGERY
Payer: COMMERCIAL

## 2024-10-07 ENCOUNTER — ANESTHESIA EVENT (OUTPATIENT)
Dept: OPERATING ROOM | Facility: HOSPITAL | Age: 29
DRG: 620 | End: 2024-10-07
Payer: COMMERCIAL

## 2024-10-07 ENCOUNTER — ANESTHESIA (OUTPATIENT)
Dept: OPERATING ROOM | Facility: HOSPITAL | Age: 29
DRG: 620 | End: 2024-10-07
Payer: COMMERCIAL

## 2024-10-07 DIAGNOSIS — E66.01 MORBID OBESITY (MULTI): ICD-10-CM

## 2024-10-07 DIAGNOSIS — E66.01 MORBID OBESITY WITH BMI OF 45.0-49.9, ADULT (MULTI): Primary | ICD-10-CM

## 2024-10-07 DIAGNOSIS — G47.33 OBSTRUCTIVE SLEEP APNEA: ICD-10-CM

## 2024-10-07 DIAGNOSIS — K21.9 GASTROESOPHAGEAL REFLUX DISEASE, UNSPECIFIED WHETHER ESOPHAGITIS PRESENT: ICD-10-CM

## 2024-10-07 DIAGNOSIS — Z98.84 S/P BARIATRIC SURGERY: ICD-10-CM

## 2024-10-07 DIAGNOSIS — E78.00 HYPERCHOLESTEROLEMIA: ICD-10-CM

## 2024-10-07 LAB — PREGNANCY TEST URINE, POC: NEGATIVE

## 2024-10-07 PROCEDURE — 43644 LAP GASTRIC BYPASS/ROUX-EN-Y: CPT | Performed by: SURGERY

## 2024-10-07 PROCEDURE — 2720000007 HC OR 272 NO HCPCS: Performed by: SURGERY

## 2024-10-07 PROCEDURE — 96372 THER/PROPH/DIAG INJ SC/IM: CPT

## 2024-10-07 PROCEDURE — 3700000002 HC GENERAL ANESTHESIA TIME - EACH INCREMENTAL 1 MINUTE: Performed by: SURGERY

## 2024-10-07 PROCEDURE — 7100000001 HC RECOVERY ROOM TIME - INITIAL BASE CHARGE: Performed by: SURGERY

## 2024-10-07 PROCEDURE — 2500000004 HC RX 250 GENERAL PHARMACY W/ HCPCS (ALT 636 FOR OP/ED): Performed by: NURSE ANESTHETIST, CERTIFIED REGISTERED

## 2024-10-07 PROCEDURE — 0D164ZA BYPASS STOMACH TO JEJUNUM, PERCUTANEOUS ENDOSCOPIC APPROACH: ICD-10-PCS | Performed by: SURGERY

## 2024-10-07 PROCEDURE — 2500000004 HC RX 250 GENERAL PHARMACY W/ HCPCS (ALT 636 FOR OP/ED): Performed by: SURGERY

## 2024-10-07 PROCEDURE — 2500000004 HC RX 250 GENERAL PHARMACY W/ HCPCS (ALT 636 FOR OP/ED)

## 2024-10-07 PROCEDURE — 3700000001 HC GENERAL ANESTHESIA TIME - INITIAL BASE CHARGE: Performed by: SURGERY

## 2024-10-07 PROCEDURE — 3600000004 HC OR TIME - INITIAL BASE CHARGE - PROCEDURE LEVEL FOUR: Performed by: SURGERY

## 2024-10-07 PROCEDURE — 1100000001 HC PRIVATE ROOM DAILY

## 2024-10-07 PROCEDURE — A43644 PR LAP GASTRIC BYPASS/ROUX-EN-Y: Performed by: NURSE ANESTHETIST, CERTIFIED REGISTERED

## 2024-10-07 PROCEDURE — 7100000002 HC RECOVERY ROOM TIME - EACH INCREMENTAL 1 MINUTE: Performed by: SURGERY

## 2024-10-07 PROCEDURE — 81025 URINE PREGNANCY TEST: CPT

## 2024-10-07 PROCEDURE — 3600000009 HC OR TIME - EACH INCREMENTAL 1 MINUTE - PROCEDURE LEVEL FOUR: Performed by: SURGERY

## 2024-10-07 PROCEDURE — 2500000005 HC RX 250 GENERAL PHARMACY W/O HCPCS: Performed by: NURSE ANESTHETIST, CERTIFIED REGISTERED

## 2024-10-07 PROCEDURE — A43644 PR LAP GASTRIC BYPASS/ROUX-EN-Y: Performed by: STUDENT IN AN ORGANIZED HEALTH CARE EDUCATION/TRAINING PROGRAM

## 2024-10-07 RX ORDER — SODIUM CHLORIDE, SODIUM LACTATE, POTASSIUM CHLORIDE, CALCIUM CHLORIDE 600; 310; 30; 20 MG/100ML; MG/100ML; MG/100ML; MG/100ML
100 INJECTION, SOLUTION INTRAVENOUS CONTINUOUS
Status: DISCONTINUED | OUTPATIENT
Start: 2024-10-07 | End: 2024-10-07 | Stop reason: HOSPADM

## 2024-10-07 RX ORDER — HEPARIN SODIUM 5000 [USP'U]/ML
5000 INJECTION, SOLUTION INTRAVENOUS; SUBCUTANEOUS ONCE
Status: COMPLETED | OUTPATIENT
Start: 2024-10-07 | End: 2024-10-07

## 2024-10-07 RX ORDER — BUPRENORPHINE HYDROCHLORIDE 0.32 MG/ML
0.1 INJECTION INTRAMUSCULAR; INTRAVENOUS EVERY 6 HOURS PRN
Status: DISCONTINUED | OUTPATIENT
Start: 2024-10-07 | End: 2024-10-08 | Stop reason: HOSPADM

## 2024-10-07 RX ORDER — METOCLOPRAMIDE HYDROCHLORIDE 5 MG/ML
10 INJECTION INTRAMUSCULAR; INTRAVENOUS EVERY 6 HOURS PRN
Status: DISCONTINUED | OUTPATIENT
Start: 2024-10-07 | End: 2024-10-08 | Stop reason: HOSPADM

## 2024-10-07 RX ORDER — SODIUM CHLORIDE, SODIUM LACTATE, POTASSIUM CHLORIDE, CALCIUM CHLORIDE 600; 310; 30; 20 MG/100ML; MG/100ML; MG/100ML; MG/100ML
20 INJECTION, SOLUTION INTRAVENOUS CONTINUOUS
Status: DISCONTINUED | OUTPATIENT
Start: 2024-10-07 | End: 2024-10-07 | Stop reason: WASHOUT

## 2024-10-07 RX ORDER — ESCITALOPRAM OXALATE 20 MG/1
20 TABLET ORAL DAILY
Status: DISCONTINUED | OUTPATIENT
Start: 2024-10-08 | End: 2024-10-08 | Stop reason: HOSPADM

## 2024-10-07 RX ORDER — METOCLOPRAMIDE HYDROCHLORIDE 5 MG/ML
10 INJECTION INTRAMUSCULAR; INTRAVENOUS ONCE
Status: COMPLETED | OUTPATIENT
Start: 2024-10-07 | End: 2024-10-07

## 2024-10-07 RX ORDER — CEFAZOLIN 1 G/1
INJECTION, POWDER, FOR SOLUTION INTRAVENOUS AS NEEDED
Status: DISCONTINUED | OUTPATIENT
Start: 2024-10-07 | End: 2024-10-07

## 2024-10-07 RX ORDER — LAMOTRIGINE 200 MG/1
200 TABLET ORAL DAILY
Status: DISCONTINUED | OUTPATIENT
Start: 2024-10-08 | End: 2024-10-08 | Stop reason: HOSPADM

## 2024-10-07 RX ORDER — ACETAMINOPHEN 10 MG/ML
1000 INJECTION, SOLUTION INTRAVENOUS ONCE
Status: COMPLETED | OUTPATIENT
Start: 2024-10-07 | End: 2024-10-07

## 2024-10-07 RX ORDER — FENTANYL CITRATE 50 UG/ML
INJECTION, SOLUTION INTRAMUSCULAR; INTRAVENOUS AS NEEDED
Status: DISCONTINUED | OUTPATIENT
Start: 2024-10-07 | End: 2024-10-07

## 2024-10-07 RX ORDER — SODIUM CHLORIDE, SODIUM LACTATE, POTASSIUM CHLORIDE, CALCIUM CHLORIDE 600; 310; 30; 20 MG/100ML; MG/100ML; MG/100ML; MG/100ML
125 INJECTION, SOLUTION INTRAVENOUS CONTINUOUS
Status: DISCONTINUED | OUTPATIENT
Start: 2024-10-07 | End: 2024-10-08 | Stop reason: HOSPADM

## 2024-10-07 RX ORDER — SCOLOPAMINE TRANSDERMAL SYSTEM 1 MG/1
1 PATCH, EXTENDED RELEASE TRANSDERMAL ONCE
Status: DISCONTINUED | OUTPATIENT
Start: 2024-10-07 | End: 2024-10-07

## 2024-10-07 RX ORDER — HYDROMORPHONE HYDROCHLORIDE 2 MG/ML
INJECTION, SOLUTION INTRAMUSCULAR; INTRAVENOUS; SUBCUTANEOUS AS NEEDED
Status: DISCONTINUED | OUTPATIENT
Start: 2024-10-07 | End: 2024-10-07

## 2024-10-07 RX ORDER — ONDANSETRON HYDROCHLORIDE 2 MG/ML
4 INJECTION, SOLUTION INTRAVENOUS ONCE AS NEEDED
Status: DISCONTINUED | OUTPATIENT
Start: 2024-10-07 | End: 2024-10-07 | Stop reason: HOSPADM

## 2024-10-07 RX ORDER — HEPARIN SODIUM 5000 [USP'U]/ML
5000 INJECTION, SOLUTION INTRAVENOUS; SUBCUTANEOUS EVERY 8 HOURS
Status: DISCONTINUED | OUTPATIENT
Start: 2024-10-07 | End: 2024-10-08 | Stop reason: HOSPADM

## 2024-10-07 RX ORDER — HYDRALAZINE HYDROCHLORIDE 20 MG/ML
5 INJECTION INTRAMUSCULAR; INTRAVENOUS EVERY 4 HOURS PRN
Status: DISCONTINUED | OUTPATIENT
Start: 2024-10-07 | End: 2024-10-08 | Stop reason: HOSPADM

## 2024-10-07 RX ORDER — PROPOFOL 10 MG/ML
INJECTION, EMULSION INTRAVENOUS AS NEEDED
Status: DISCONTINUED | OUTPATIENT
Start: 2024-10-07 | End: 2024-10-07

## 2024-10-07 RX ORDER — OXYCODONE HYDROCHLORIDE 5 MG/1
10 TABLET ORAL EVERY 4 HOURS PRN
Status: DISCONTINUED | OUTPATIENT
Start: 2024-10-07 | End: 2024-10-07 | Stop reason: HOSPADM

## 2024-10-07 RX ORDER — NALOXONE HYDROCHLORIDE 0.4 MG/ML
0.2 INJECTION, SOLUTION INTRAMUSCULAR; INTRAVENOUS; SUBCUTANEOUS EVERY 5 MIN PRN
Status: DISCONTINUED | OUTPATIENT
Start: 2024-10-07 | End: 2024-10-08 | Stop reason: HOSPADM

## 2024-10-07 RX ORDER — ACETAMINOPHEN 10 MG/ML
1000 INJECTION, SOLUTION INTRAVENOUS EVERY 6 HOURS
Status: DISCONTINUED | OUTPATIENT
Start: 2024-10-07 | End: 2024-10-08

## 2024-10-07 RX ORDER — ROCURONIUM BROMIDE 10 MG/ML
INJECTION, SOLUTION INTRAVENOUS AS NEEDED
Status: DISCONTINUED | OUTPATIENT
Start: 2024-10-07 | End: 2024-10-07

## 2024-10-07 RX ORDER — ALBUTEROL SULFATE 0.83 MG/ML
2.5 SOLUTION RESPIRATORY (INHALATION) ONCE AS NEEDED
Status: DISCONTINUED | OUTPATIENT
Start: 2024-10-07 | End: 2024-10-07 | Stop reason: HOSPADM

## 2024-10-07 RX ORDER — PANTOPRAZOLE SODIUM 40 MG/10ML
40 INJECTION, POWDER, LYOPHILIZED, FOR SOLUTION INTRAVENOUS ONCE
Status: COMPLETED | OUTPATIENT
Start: 2024-10-07 | End: 2024-10-07

## 2024-10-07 RX ORDER — ONDANSETRON HYDROCHLORIDE 2 MG/ML
4 INJECTION, SOLUTION INTRAVENOUS EVERY 8 HOURS PRN
Status: DISCONTINUED | OUTPATIENT
Start: 2024-10-07 | End: 2024-10-08 | Stop reason: HOSPADM

## 2024-10-07 RX ORDER — MIDAZOLAM HYDROCHLORIDE 1 MG/ML
INJECTION, SOLUTION INTRAMUSCULAR; INTRAVENOUS AS NEEDED
Status: DISCONTINUED | OUTPATIENT
Start: 2024-10-07 | End: 2024-10-07

## 2024-10-07 RX ORDER — OXYCODONE HYDROCHLORIDE 5 MG/1
5 TABLET ORAL EVERY 4 HOURS PRN
Status: DISCONTINUED | OUTPATIENT
Start: 2024-10-07 | End: 2024-10-07 | Stop reason: HOSPADM

## 2024-10-07 RX ORDER — PANTOPRAZOLE SODIUM 40 MG/10ML
40 INJECTION, POWDER, LYOPHILIZED, FOR SOLUTION INTRAVENOUS
Status: DISCONTINUED | OUTPATIENT
Start: 2024-10-08 | End: 2024-10-08 | Stop reason: HOSPADM

## 2024-10-07 RX ORDER — ACETAMINOPHEN 325 MG/1
650 TABLET ORAL EVERY 4 HOURS PRN
Status: DISCONTINUED | OUTPATIENT
Start: 2024-10-07 | End: 2024-10-07 | Stop reason: HOSPADM

## 2024-10-07 RX ORDER — CEFAZOLIN SODIUM IN 0.9 % NACL 3 G/100 ML
3 INTRAVENOUS SOLUTION, PIGGYBACK (ML) INTRAVENOUS ONCE
Status: DISCONTINUED | OUTPATIENT
Start: 2024-10-07 | End: 2024-10-07 | Stop reason: HOSPADM

## 2024-10-07 RX ORDER — HYDROMORPHONE HYDROCHLORIDE 0.2 MG/ML
0.2 INJECTION INTRAMUSCULAR; INTRAVENOUS; SUBCUTANEOUS EVERY 5 MIN PRN
Status: DISCONTINUED | OUTPATIENT
Start: 2024-10-07 | End: 2024-10-07 | Stop reason: HOSPADM

## 2024-10-07 RX ORDER — ONDANSETRON HYDROCHLORIDE 2 MG/ML
INJECTION, SOLUTION INTRAVENOUS AS NEEDED
Status: DISCONTINUED | OUTPATIENT
Start: 2024-10-07 | End: 2024-10-07

## 2024-10-07 RX ORDER — ESMOLOL HYDROCHLORIDE 10 MG/ML
INJECTION INTRAVENOUS AS NEEDED
Status: DISCONTINUED | OUTPATIENT
Start: 2024-10-07 | End: 2024-10-07

## 2024-10-07 SDOH — SOCIAL STABILITY: SOCIAL INSECURITY: WITHIN THE LAST YEAR, HAVE YOU BEEN HUMILIATED OR EMOTIONALLY ABUSED IN OTHER WAYS BY YOUR PARTNER OR EX-PARTNER?: NO

## 2024-10-07 SDOH — ECONOMIC STABILITY: HOUSING INSECURITY: IN THE PAST 12 MONTHS, HOW MANY TIMES HAVE YOU MOVED WHERE YOU WERE LIVING?: 1

## 2024-10-07 SDOH — SOCIAL STABILITY: SOCIAL INSECURITY: ARE THERE ANY APPARENT SIGNS OF INJURIES/BEHAVIORS THAT COULD BE RELATED TO ABUSE/NEGLECT?: NO

## 2024-10-07 SDOH — SOCIAL STABILITY: SOCIAL INSECURITY
WITHIN THE LAST YEAR, HAVE TO BEEN RAPED OR FORCED TO HAVE ANY KIND OF SEXUAL ACTIVITY BY YOUR PARTNER OR EX-PARTNER?: NO

## 2024-10-07 SDOH — ECONOMIC STABILITY: INCOME INSECURITY: IN THE PAST 12 MONTHS, HAS THE ELECTRIC, GAS, OIL, OR WATER COMPANY THREATENED TO SHUT OFF SERVICE IN YOUR HOME?: NO

## 2024-10-07 SDOH — SOCIAL STABILITY: SOCIAL INSECURITY
WITHIN THE LAST YEAR, HAVE YOU BEEN KICKED, HIT, SLAPPED, OR OTHERWISE PHYSICALLY HURT BY YOUR PARTNER OR EX-PARTNER?: NO

## 2024-10-07 SDOH — SOCIAL STABILITY: SOCIAL INSECURITY: HAS ANYONE EVER THREATENED TO HURT YOUR FAMILY OR YOUR PETS?: NO

## 2024-10-07 SDOH — ECONOMIC STABILITY: FOOD INSECURITY: WITHIN THE PAST 12 MONTHS, THE FOOD YOU BOUGHT JUST DIDN'T LAST AND YOU DIDN'T HAVE MONEY TO GET MORE.: NEVER TRUE

## 2024-10-07 SDOH — ECONOMIC STABILITY: HOUSING INSECURITY: AT ANY TIME IN THE PAST 12 MONTHS, WERE YOU HOMELESS OR LIVING IN A SHELTER (INCLUDING NOW)?: NO

## 2024-10-07 SDOH — SOCIAL STABILITY: SOCIAL INSECURITY: ARE YOU OR HAVE YOU BEEN THREATENED OR ABUSED PHYSICALLY, EMOTIONALLY, OR SEXUALLY BY ANYONE?: NO

## 2024-10-07 SDOH — SOCIAL STABILITY: SOCIAL INSECURITY: DO YOU FEEL UNSAFE GOING BACK TO THE PLACE WHERE YOU ARE LIVING?: NO

## 2024-10-07 SDOH — SOCIAL STABILITY: SOCIAL INSECURITY: WERE YOU ABLE TO COMPLETE ALL THE BEHAVIORAL HEALTH SCREENINGS?: YES

## 2024-10-07 SDOH — SOCIAL STABILITY: SOCIAL INSECURITY: WITHIN THE LAST YEAR, HAVE YOU BEEN AFRAID OF YOUR PARTNER OR EX-PARTNER?: NO

## 2024-10-07 SDOH — SOCIAL STABILITY: SOCIAL INSECURITY: ABUSE: ADULT

## 2024-10-07 SDOH — ECONOMIC STABILITY: FOOD INSECURITY: WITHIN THE PAST 12 MONTHS, YOU WORRIED THAT YOUR FOOD WOULD RUN OUT BEFORE YOU GOT MONEY TO BUY MORE.: NEVER TRUE

## 2024-10-07 SDOH — ECONOMIC STABILITY: INCOME INSECURITY: HOW HARD IS IT FOR YOU TO PAY FOR THE VERY BASICS LIKE FOOD, HOUSING, MEDICAL CARE, AND HEATING?: NOT HARD AT ALL

## 2024-10-07 SDOH — ECONOMIC STABILITY: INCOME INSECURITY: IN THE LAST 12 MONTHS, WAS THERE A TIME WHEN YOU WERE NOT ABLE TO PAY THE MORTGAGE OR RENT ON TIME?: NO

## 2024-10-07 SDOH — ECONOMIC STABILITY: TRANSPORTATION INSECURITY
IN THE PAST 12 MONTHS, HAS THE LACK OF TRANSPORTATION KEPT YOU FROM MEDICAL APPOINTMENTS OR FROM GETTING MEDICATIONS?: NO

## 2024-10-07 SDOH — SOCIAL STABILITY: SOCIAL INSECURITY: HAVE YOU HAD ANY THOUGHTS OF HARMING ANYONE ELSE?: NO

## 2024-10-07 SDOH — SOCIAL STABILITY: SOCIAL INSECURITY: HAVE YOU HAD THOUGHTS OF HARMING ANYONE ELSE?: NO

## 2024-10-07 SDOH — SOCIAL STABILITY: SOCIAL INSECURITY: DOES ANYONE TRY TO KEEP YOU FROM HAVING/CONTACTING OTHER FRIENDS OR DOING THINGS OUTSIDE YOUR HOME?: NO

## 2024-10-07 SDOH — ECONOMIC STABILITY: TRANSPORTATION INSECURITY
IN THE PAST 12 MONTHS, HAS LACK OF TRANSPORTATION KEPT YOU FROM MEETINGS, WORK, OR FROM GETTING THINGS NEEDED FOR DAILY LIVING?: NO

## 2024-10-07 SDOH — SOCIAL STABILITY: SOCIAL INSECURITY: DO YOU FEEL ANYONE HAS EXPLOITED OR TAKEN ADVANTAGE OF YOU FINANCIALLY OR OF YOUR PERSONAL PROPERTY?: NO

## 2024-10-07 ASSESSMENT — PATIENT HEALTH QUESTIONNAIRE - PHQ9
SUM OF ALL RESPONSES TO PHQ9 QUESTIONS 1 & 2: 0
1. LITTLE INTEREST OR PLEASURE IN DOING THINGS: NOT AT ALL
2. FEELING DOWN, DEPRESSED OR HOPELESS: NOT AT ALL

## 2024-10-07 ASSESSMENT — ACTIVITIES OF DAILY LIVING (ADL)
FEEDING YOURSELF: INDEPENDENT
DRESSING YOURSELF: INDEPENDENT
PATIENT'S MEMORY ADEQUATE TO SAFELY COMPLETE DAILY ACTIVITIES?: YES
WALKS IN HOME: INDEPENDENT
TOILETING: INDEPENDENT
HEARING - RIGHT EAR: FUNCTIONAL
BATHING: INDEPENDENT
JUDGMENT_ADEQUATE_SAFELY_COMPLETE_DAILY_ACTIVITIES: YES
LACK_OF_TRANSPORTATION: NO
ADEQUATE_TO_COMPLETE_ADL: YES
GROOMING: INDEPENDENT
HEARING - LEFT EAR: FUNCTIONAL

## 2024-10-07 ASSESSMENT — LIFESTYLE VARIABLES
AUDIT-C TOTAL SCORE: 3
SKIP TO QUESTIONS 9-10: 1
HOW OFTEN DO YOU HAVE 6 OR MORE DRINKS ON ONE OCCASION: NEVER
HOW OFTEN DO YOU HAVE A DRINK CONTAINING ALCOHOL: 2-3 TIMES A WEEK
AUDIT-C TOTAL SCORE: 3
HOW MANY STANDARD DRINKS CONTAINING ALCOHOL DO YOU HAVE ON A TYPICAL DAY: 1 OR 2

## 2024-10-07 ASSESSMENT — COGNITIVE AND FUNCTIONAL STATUS - GENERAL
DRESSING REGULAR LOWER BODY CLOTHING: A LITTLE
DAILY ACTIVITIY SCORE: 23
CLIMB 3 TO 5 STEPS WITH RAILING: A LITTLE
PATIENT BASELINE BEDBOUND: NO
STANDING UP FROM CHAIR USING ARMS: A LITTLE
MOBILITY SCORE: 21
TURNING FROM BACK TO SIDE WHILE IN FLAT BAD: A LITTLE

## 2024-10-07 ASSESSMENT — COLUMBIA-SUICIDE SEVERITY RATING SCALE - C-SSRS
2. HAVE YOU ACTUALLY HAD ANY THOUGHTS OF KILLING YOURSELF?: NO
2. HAVE YOU ACTUALLY HAD ANY THOUGHTS OF KILLING YOURSELF?: NO
1. IN THE PAST MONTH, HAVE YOU WISHED YOU WERE DEAD OR WISHED YOU COULD GO TO SLEEP AND NOT WAKE UP?: NO
1. IN THE PAST MONTH, HAVE YOU WISHED YOU WERE DEAD OR WISHED YOU COULD GO TO SLEEP AND NOT WAKE UP?: NO
6. HAVE YOU EVER DONE ANYTHING, STARTED TO DO ANYTHING, OR PREPARED TO DO ANYTHING TO END YOUR LIFE?: NO
6. HAVE YOU EVER DONE ANYTHING, STARTED TO DO ANYTHING, OR PREPARED TO DO ANYTHING TO END YOUR LIFE?: NO

## 2024-10-07 ASSESSMENT — PAIN SCALES - GENERAL
PAINLEVEL_OUTOF10: 0 - NO PAIN
PAINLEVEL_OUTOF10: 4

## 2024-10-07 ASSESSMENT — PAIN - FUNCTIONAL ASSESSMENT
PAIN_FUNCTIONAL_ASSESSMENT: 0-10
PAIN_FUNCTIONAL_ASSESSMENT: FLACC (FACE, LEGS, ACTIVITY, CRY, CONSOLABILITY)
PAIN_FUNCTIONAL_ASSESSMENT: FLACC (FACE, LEGS, ACTIVITY, CRY, CONSOLABILITY)
PAIN_FUNCTIONAL_ASSESSMENT: 0-10

## 2024-10-07 ASSESSMENT — PAIN DESCRIPTION - DESCRIPTORS: DESCRIPTORS: CRAMPING;TIGHTNESS

## 2024-10-07 NOTE — OP NOTE
Gastric Bypass Laparoscopic **PAT ON ADMIT** Operative Note     Date: 10/7/2024  OR Location: [facility]    Name: Sindhu Herron, : 1995, Age: 29 y.o., MRN: [id number], Sex: female    Diagnosis  Pre-op Diagnosis      * Morbid obesity (Multi) [E66.01]     * Obstructive sleep apnea [G47.33]     * Hypercholesterolemia [E78.00]     * Gastroesophageal reflux disease, unspecified whether esophagitis present [K21.9] Post-op Diagnosis     * Morbid obesity (Multi) [E66.01]     * Obstructive sleep apnea [G47.33]     * Hypercholesterolemia [E78.00]     * Gastroesophageal reflux disease, unspecified whether esophagitis present [K21.9]     Procedures  Gastric Bypass Laparoscopic **PAT ON ADMIT**  10634 - NE LAPS GSTR RSTCV PX W/BYP MARNIE-EN-Y LIMB <150 CM      Surgeons      * Chalino Mora - Primary    Resident/Fellow/Other Assistant:  Surgeons and Role:     * Mona Shen PA-C - Assisting    Procedure Summary  Anesthesia: General  ASA: III  Anesthesia Staff: Anesthesiologist: Tonie Littlejohn MD  CRNA: SANTHOSH Phillips-CRNA  Estimated Blood Loss: 0mL  Intra-op Medications:   Administrations occurring from 1200 to 1630 on 10/07/24:   Medication Name Total Dose   BUPivacaine HCl (Marcaine) 0.5 % (5 mg/mL) 30 mL, lidocaine PF (Xylocaine) 10 mg/mL (1 %) 30 mL in sodium chloride 0.9% 60 mL syringe 120 mL              Anesthesia Record               Intraprocedure I/O Totals       None           Specimen: No specimens collected     Staff:   Circulator: Ashley  Scrub Person: [facility]  Scrub Person: Dilshad SOLO: Rianna Ramos Circulator: Pamela Ramos Scrub: Tanya          Procedure: Laparoscopic Marnie-en-Y gastric bypass utilizing 100 cm retrocolic antigastric Marnie limb    Findings: No leak on air leak test    Indications: Sindhu Herron is an 29 y.o. female who is having surgery for Morbid obesity (Multi) [E66.01].  Sindhu is a 29-year-old that weighs 280 pounds and has a BMI of 48.  She has failed maximal  medical attempts weight loss prompting referral for surgical intervention.  She chose to proceed with Yoel-en-Y gastric bypass.  Risks and benefits of surgery were explained in extensive detail including but not limited to death, venous thromboembolic event, GI tract leak, bleeding, bowel obstruction, inadequate weight loss, nutritional deficiencies, gastrojejunal ulcer, need for lifelong supplementation, need for lifestyle modification.  Consent was obtained.  Sindhu was seen in the preoperative area. The risks, benefits, complications, treatment options, non-operative alternatives, expected recovery and outcomes were discussed with the patient. The possibilities of reaction to medication, pulmonary aspiration, injury to surrounding structures, bleeding, recurrent infection, the need for additional procedures, failure to diagnose a condition, and creating a complication requiring transfusion or operation were discussed with the patient. The patient concurred with the proposed plan, giving informed consent.  The site of surgery was properly noted/marked if necessary per policy. The patient has been actively warmed in preoperative area. Preoperative antibiotics have been ordered and given within 1 hours of incision. Venous thrombosis prophylaxis have been ordered including bilateral sequential compression devices and chemical prophylaxis    Procedure Details: Sindhu was given subcutaneous heparin and antibiotics in the preoperative holding area.  She was brought to the operating room.  SCDs were placed.  Preoperative huddle was completed.  She was given IV sedation and intubated.  She was placed in a split position.  Her abdomen is prepped and draped in a sterile fashion.  I entered the abdomen in the left midclavicular line with a 0 degree scope and a 12 mm nonbladed trocar.  I insufflated the abdomen and switched an angled scope.  I placed the remainder my trocars and a subxiphoid Aron liver retractor.  I  opened the peritoneum at the angle of Hiss and excised the fat pad.  I opened the gastrocolic ligament and freed up the retrogastric adhesions.  I flatten the patient paddled up the omentum and grasped mesocolon.  I made a window in the mesocolon above the ligament of Treitz just to the right.  I measured down 40 cm from the ligament of Treitz and divided the jejunum with a blue load Endo BIANCA.  I marked my Yoel limb.  I divided the small bowel mesentery taking care not to devascularize either end.  I measured to 100 cm and then placed a stay suture of 2-0 Vicryl from the Yoel limb to the biliary limb.  I made enterotomies opposite each other and fired a white load Endo BIANCA.  I closed the common enterotomy with a 2-0 Vicryl.  I closed the mesenteric defect with a running locked 2-0 Ethibond.  I brought my Yoel limb through the mesocolic window.  I put the patient in reverse Trendelenburg.  I made a window along the lesser curvature the stomach 4 cm below the angle of Hiss.  I had anesthesia pass a 34 Eslick tube.  I fired a blue load Endo BIANCA crating horizontal staple line.  I used 2 firings of the blue load Endo BIANCA to divide the pouch from the excluded stomach.  I put clips on the staple line to reduce the risk of postoperative bleeding.  I ran a Bakkar of 2-0 Vicryl from the antimesenteric border of the Yoel limb to the posterior aspect of the pouch.  I made a gastrotomy and an enterotomy.  I fired a blue load Endo BIANCA for distance of 2 cm.  I closed the common enterotomy with a 2-0 Vicryl starting at either end meeting in the middle.  I had anesthesia pass an 18 Macedonian orogastric tube.  I ran an outer row of 2-0 Vicryl completely imbricating the anastomosis.  I put a bowel clamp on the Yoel limb distal to the orogastric tube.  I flatten the patient and submerged the anastomosis.  I had anesthesia perform an air leak test.  There was an excellent distention of the Yoel limb without any air leak.  I had them remove  the air and then the tube.  I took off the clamp.  There was no trauma from the clamp.  I sucked out saline.  I flatten the patient paddled up the omentum and grasped mesocolon.  I pulled on the excess small bowel.  I closed the Petersons defect with a running locked 2-0 Ethibond.  I placed multiple interrupted 2-0 Ethibond sutures from the small bowel to the mesocolon.  I ran the small bowel from the mesocolic window to the enteroenterostomy.  There was no tension and there were no kinks.  I then put the patient back in reverse Trendelenburg and paddled on the omentum.  There was no tension on the gastrojejunostomy or the Yoel limb.  I put fibrin sealant on the anastomosis and let that cure.  I removed my subxiphoid Aron liver retractor.  I closed the 2 inferior 12 mm fascial defect with 0 Vicryl.  I performed bilateral rectus tap block with dilute Marcaine.  I removed all the trocars.  There was no bleeding.  I irrigated the subcutaneous tissue.  I closed the skin with 4-0 undyed Monocryl.  I placed Dermabond.  Sindhu's anesthetic was reversed, she was extubated and brought to the recovery in stable condition.  All counts were correct.  She tolerated procedure well.  She was placed on CPAP per protocol for patients with sleep apnea.  Complications:  None; patient tolerated the procedure well.    Disposition: PACU - hemodynamically stable.  Condition: stable         Chalino Mora  Phone Number: [phone]

## 2024-10-07 NOTE — ANESTHESIA PROCEDURE NOTES
Airway  Date/Time: 10/7/2024 2:32 PM  Urgency: elective      Staffing  Performed: CRNA   Authorized by: Tonie Littlejohn MD    Performed by: SANTHOSH Phillips-CRNA  Patient location during procedure: OR    Indications and Patient Condition  Indications for airway management: anesthesia  Spontaneous Ventilation: absent  Sedation level: deep  Preoxygenated: yes  Patient position: sniffing  Mask difficulty assessment: 1 - vent by mask    Final Airway Details  Final airway type: endotracheal airway      Successful airway: ETT  Cuffed: yes   Successful intubation technique: direct laryngoscopy  Facilitating devices/methods: intubating stylet  Blade: Ky  Blade size: #3  ETT size (mm): 7.0  Cormack-Lehane Classification: grade I - full view of glottis  Placement verified by: chest auscultation and capnometry   Measured from: lips  ETT to lips (cm): 22  Number of attempts at approach: 1

## 2024-10-07 NOTE — ANESTHESIA POSTPROCEDURE EVALUATION
Patient: Sindhu Herron    Procedure Summary       Date: 10/07/24 Room / Location: [facility] OR 11 / [facility]    Anesthesia Start: 1424 Anesthesia Stop: 1757    Procedure: Gastric Bypass Laparoscopic **PAT ON ADMIT** (Abdomen) Diagnosis:       Morbid obesity (Multi)      Obstructive sleep apnea      Hypercholesterolemia      Gastroesophageal reflux disease, unspecified whether esophagitis present      (Morbid obesity (Multi) [E66.01])    Surgeons: Chalino Mora MD Responsible Provider: Tonie Littlejohn MD    Anesthesia Type: general ASA Status: 3            Anesthesia Type: general    Vitals Value Taken Time   /63 10/07/24 1801   Temp 36.2 °C (97.2 °F) 10/07/24 1756   Pulse 102 10/07/24 1807   Resp 17 10/07/24 1807   SpO2 94 % 10/07/24 1807   Vitals shown include unfiled device data.    Anesthesia Post Evaluation    Patient location during evaluation: bedside  Patient participation: complete - patient participated  Level of consciousness: awake and alert  Pain management: adequate  Multimodal analgesia pain management approach  Airway patency: patent  Cardiovascular status: acceptable  Respiratory status: acceptable  Hydration status: acceptable  Postoperative Nausea and Vomiting: none        No notable events documented.     instructions given to EMS/Patient/Caregiver

## 2024-10-07 NOTE — ANESTHESIA PREPROCEDURE EVALUATION
Patient: Sindhu Herron    Procedure Information       Date/Time: 10/07/24 1200    Procedure: Gastric Bypass Laparoscopic **PAT ON ADMIT**    Location: [facility] OR  / [facility]    Surgeons: Chalino Mora MD            Relevant Problems   Anesthesia (within normal limits)      Cardiac   (+) Hypercholesterolemia      Pulmonary (within normal limits)      Neuro   (+) Anxiety disorder   (+) Bipolar 2 disorder (Multi)      GI (within normal limits)      /Renal (within normal limits)      Liver (within normal limits)      Endocrine   (+) Morbid obesity (Multi)      Hematology (within normal limits)      Musculoskeletal (within normal limits)      HEENT (within normal limits)      ID (within normal limits)      Skin (within normal limits)      GYN (within normal limits)     Past Medical History:   Diagnosis Date    Anxiety disorder, unspecified     Foot pain, bilateral 01/23/2023    GERD (gastroesophageal reflux disease)     High cholesterol     Hypercholesterolemia     Ingrown toenail of both feet 01/23/2023     Past Surgical History:   Procedure Laterality Date    WISDOM TOOTH EXTRACTION       No Known Allergies    Clinical information reviewed:   Tobacco  Allergies  Meds   Med Hx  Surg Hx  OB Status  Fam Hx  Soc   Hx        NPO Detail:  NPO/Void Status  Carbohydrate Drink Given Prior to Surgery? : N  Date of Last Liquid: 10/06/24  Time of Last Liquid: 2000  Date of Last Solid: 10/05/24  Time of Last Solid: 2000  Last Intake Type: Clear fluids  Time of Last Void: 1022         Physical Exam    Airway  Mallampati: II  TM distance: >3 FB  Neck ROM: full     Cardiovascular   Rhythm: regular  Rate: normal     Dental    Pulmonary   Breath sounds clear to auscultation     Abdominal            Anesthesia Plan    History of general anesthesia?: yes  History of complications of general anesthesia?: no    ASA 3     general     intravenous induction   Postoperative administration of opioids is intended.  Anesthetic  plan and risks discussed with patient.  Use of blood products discussed with patient who.

## 2024-10-08 ENCOUNTER — APPOINTMENT (OUTPATIENT)
Dept: RADIOLOGY | Facility: HOSPITAL | Age: 29
DRG: 620 | End: 2024-10-08
Payer: COMMERCIAL

## 2024-10-08 ENCOUNTER — PHARMACY VISIT (OUTPATIENT)
Dept: PHARMACY | Facility: CLINIC | Age: 29
End: 2024-10-08
Payer: COMMERCIAL

## 2024-10-08 VITALS
HEIGHT: 65 IN | RESPIRATION RATE: 17 BRPM | WEIGHT: 292.33 LBS | HEART RATE: 67 BPM | BODY MASS INDEX: 48.71 KG/M2 | TEMPERATURE: 97.7 F | OXYGEN SATURATION: 96 % | SYSTOLIC BLOOD PRESSURE: 135 MMHG | DIASTOLIC BLOOD PRESSURE: 77 MMHG

## 2024-10-08 PROCEDURE — 2500000004 HC RX 250 GENERAL PHARMACY W/ HCPCS (ALT 636 FOR OP/ED): Performed by: SURGERY

## 2024-10-08 PROCEDURE — RXMED WILLOW AMBULATORY MEDICATION CHARGE

## 2024-10-08 PROCEDURE — RXOTC WILLOW AMBULATORY OTC CHARGE

## 2024-10-08 PROCEDURE — 2550000001 HC RX 255 CONTRASTS: Performed by: SURGERY

## 2024-10-08 PROCEDURE — 99254 IP/OBS CNSLTJ NEW/EST MOD 60: CPT | Performed by: INTERNAL MEDICINE

## 2024-10-08 PROCEDURE — 74240 X-RAY XM UPR GI TRC 1CNTRST: CPT | Performed by: RADIOLOGY

## 2024-10-08 PROCEDURE — 74240 X-RAY XM UPR GI TRC 1CNTRST: CPT

## 2024-10-08 PROCEDURE — 2500000005 HC RX 250 GENERAL PHARMACY W/O HCPCS: Performed by: SURGERY

## 2024-10-08 PROCEDURE — 99253 IP/OBS CNSLTJ NEW/EST LOW 45: CPT | Performed by: STUDENT IN AN ORGANIZED HEALTH CARE EDUCATION/TRAINING PROGRAM

## 2024-10-08 PROCEDURE — 2500000001 HC RX 250 WO HCPCS SELF ADMINISTERED DRUGS (ALT 637 FOR MEDICARE OP)

## 2024-10-08 RX ORDER — OXYCODONE HCL 5 MG/5 ML
10 SOLUTION, ORAL ORAL EVERY 4 HOURS PRN
Status: DISCONTINUED | OUTPATIENT
Start: 2024-10-08 | End: 2024-10-08 | Stop reason: HOSPADM

## 2024-10-08 RX ORDER — ACETAMINOPHEN 160 MG/5ML
650 SOLUTION ORAL EVERY 4 HOURS PRN
Start: 2024-10-08

## 2024-10-08 RX ORDER — OXYCODONE HCL 5 MG/5 ML
5 SOLUTION, ORAL ORAL EVERY 4 HOURS PRN
Status: DISCONTINUED | OUTPATIENT
Start: 2024-10-08 | End: 2024-10-08 | Stop reason: HOSPADM

## 2024-10-08 RX ORDER — OXYCODONE HCL 5 MG/5 ML
5 SOLUTION, ORAL ORAL EVERY 6 HOURS PRN
Qty: 90 ML | Refills: 0 | Status: SHIPPED | OUTPATIENT
Start: 2024-10-08

## 2024-10-08 RX ORDER — ACETAMINOPHEN 160 MG/5ML
650 SOLUTION ORAL EVERY 4 HOURS PRN
Status: DISCONTINUED | OUTPATIENT
Start: 2024-10-08 | End: 2024-10-08 | Stop reason: HOSPADM

## 2024-10-08 ASSESSMENT — PAIN - FUNCTIONAL ASSESSMENT
PAIN_FUNCTIONAL_ASSESSMENT: 0-10
PAIN_FUNCTIONAL_ASSESSMENT: FLACC (FACE, LEGS, ACTIVITY, CRY, CONSOLABILITY)
PAIN_FUNCTIONAL_ASSESSMENT: 0-10
PAIN_FUNCTIONAL_ASSESSMENT: FLACC (FACE, LEGS, ACTIVITY, CRY, CONSOLABILITY)
PAIN_FUNCTIONAL_ASSESSMENT: 0-10

## 2024-10-08 ASSESSMENT — COGNITIVE AND FUNCTIONAL STATUS - GENERAL
MOBILITY SCORE: 24
DAILY ACTIVITIY SCORE: 24

## 2024-10-08 ASSESSMENT — PAIN SCALES - GENERAL
PAINLEVEL_OUTOF10: 7
PAINLEVEL_OUTOF10: 3
PAINLEVEL_OUTOF10: 3
PAINLEVEL_OUTOF10: 0 - NO PAIN
PAINLEVEL_OUTOF10: 0 - NO PAIN
PAINLEVEL_OUTOF10: 7

## 2024-10-08 ASSESSMENT — PAIN DESCRIPTION - DESCRIPTORS
DESCRIPTORS: ACHING;SORE
DESCRIPTORS: SHARP
DESCRIPTORS: ACHING;SORE

## 2024-10-08 ASSESSMENT — ENCOUNTER SYMPTOMS
FREQUENCY: 0
DIFFICULTY URINATING: 0
ABDOMINAL PAIN: 0
NAUSEA: 0
SHORTNESS OF BREATH: 0
DYSURIA: 0
HEADACHES: 0
CONSTIPATION: 0
COUGH: 0
FEVER: 0
WEAKNESS: 0
BACK PAIN: 1
ARTHRALGIAS: 1
DIARRHEA: 0
CHILLS: 0

## 2024-10-08 NOTE — NURSING NOTE
Pt A&O x3 currently resting in bed. No complaints or concerns. Call light within reach. Pt continues on IV fluids per order. Pt npo per order.

## 2024-10-08 NOTE — CARE PLAN
Problem: Pain - Adult  Goal: Verbalizes/displays adequate comfort level or baseline comfort level  Outcome: Progressing     Problem: Safety - Adult  Goal: Free from fall injury  Outcome: Progressing     Problem: Discharge Planning  Goal: Discharge to home or other facility with appropriate resources  Outcome: Progressing     Problem: Chronic Conditions and Co-morbidities  Goal: Patient's chronic conditions and co-morbidity symptoms are monitored and maintained or improved  Outcome: Progressing   The patient's goals for the shift include      The clinical goals for the shift include 2 walks for shift, manage pain    Over the shift, the patient did not make progress toward the goals. So far we have walked once, and the pain is tolerable.

## 2024-10-08 NOTE — DISCHARGE SUMMARY
Discharge Diagnosis  Morbid obesity (Multi)    Issues Requiring Follow-Up  Follow up in the office in 2 weeks    Test Results Pending At Discharge  Pending Labs       No current pending labs.            Hospital Course  Sindhu had Laparoscopic Yoel-en-Y Gastric Bypass on 10/7.  She had an UGI on 10/8 and was started on a diet. She was discharged to home.     Pertinent Physical Exam At Time of Discharge  Physical Exam  Constitutional:       Appearance: Normal appearance.   Cardiovascular:      Rate and Rhythm: Normal rate.   Pulmonary:      Effort: Pulmonary effort is normal.   Abdominal:      Palpations: Abdomen is soft.      Tenderness: There is no abdominal tenderness.      Comments: Incisions intact.    Neurological:      Mental Status: She is alert and oriented to person, place, and time.         Home Medications     Medication List      START taking these medications     acetaminophen 325 mg/10.15 mL oral liquid; Commonly known as: Tylenol;   Take 20.3 mL (650 mg) by mouth every 4 hours if needed for pain or fever   (temp greater than 38.0 C). Take over the counter liquid tylenol as   needed. Dilute with equal parts water to prevent dumping syndrome.   oxyCODONE 5 mg/5 mL solution; Commonly known as: Roxicodone; Take 5 mL   (5 mg) by mouth every 6 hours if needed for severe pain (7 - 10). Dilute   with equal parts water to prevent dumping syndrome.     CONTINUE taking these medications     escitalopram 20 mg tablet; Commonly known as: Lexapro   lamoTRIgine 200 mg tablet; Commonly known as: LaMICtal   omeprazole 40 mg DR capsule; Commonly known as: PriLOSEC; Take 1 capsule   (40 mg) by mouth once daily in the morning. Take before meals. Do not   crush or chew.     STOP taking these medications     cholecalciferol 50 MCG (2000 UT) tablet; Commonly known as: Vitamin D-3   ferrous sulfate 325 (65 Fe) MG EC tablet   hydrOXYzine HCL 25 mg tablet; Commonly known as: Atarax   Low-Ogestrel (28) 0.3-30 mg-mcg tablet;  Generic drug: norgestrel-ethinyl   estradioL   spironolactone 100 mg tablet; Commonly known as: Aldactone   tretinoin 0.05 % cream; Commonly known as: Retin-A       Outpatient Follow-Up  Future Appointments   Date Time Provider Department [place]   10/21/2024 11:00 AM LINDA Nieto [id number] [facility]   10/21/2024 11:30 AM Sagrario Mena RDN, QIAN [id number] [facility]   11/4/2024  9:00 AM LINDA Nieto [id number] [facility]   11/4/2024  9:30 AM Sagrario Mena RDN, LD [id number] [facility]   11/18/2024  3:30 PM LINDA Nieto [id number] [facility]   11/18/2024  4:00 PM Sagrario Mena RDN, QIAN [id number] [facility]       LINDA Nieto

## 2024-10-08 NOTE — NURSING NOTE
Took over care of pt at this time. Pt laying in bed, eyes closed, respirations even and unlabored. Male visitor at bedside. Pt awakes to voice, oriented x4. Pt complains of her abdomen hurting then falls back asleep. Ice pack in place. Six puncture sites over abdomen closed with sealant, no drainage, well approximated. Pt appears to have no  needs or complaints at this time. Call bell and belongings are placed in reach. Telemetry leads connected and reading on monitor. Bed alarm set. Home bipap/cpap in place. Continuous pulse ox connected. Continuing to monitor.

## 2024-10-08 NOTE — PROGRESS NOTES
Dietary Rounds and Nutrition Education    Saw Sindhu in the hospital around 11:45am this morning, she was sitting up in her chair upon arrival. She consumed ~6oz of water and reports no issues with fluids going down.     Thin liquid diet education was provided and packet was given to patient. Reviewed the importance of consuming at least 50 oz of fluid per day once discharged. Patient was instructed to start protein shake tomorrow evening once 40-50 oz of fluid was consumed. Informed patient to gradually work way up to 50g protein per day. Also reviewed things to avoid at this time including carbonation, alcohol, sugar, jello, straws, and gum. Patient was instructed to follow liquid diet for a full 2 weeks, and to not progress diet until instructed to do so. Patient shows good understanding of education provided.      Yumiko Chand, MS, RD, LD

## 2024-10-08 NOTE — CONSULTS
ConsultsReason For Consult  GERD  Sleep apnea  History Of Present Illness  Sindhu Herron is a 29 y.o. female presenting for bariatric surgery. Denies SOB, nausea, epigastric pain tolerable  Past Medical History  She has a past medical history of Anxiety disorder, unspecified, Bipolar 1 disorder (Multi), Foot pain, bilateral (01/23/2023), GERD (gastroesophageal reflux disease), High cholesterol, Hypercholesterolemia, Ingrown toenail of both feet (01/23/2023), and Sleep apnea.    Surgical History  She has a past surgical history that includes [place] tooth extraction.     Social History  She reports that she has never smoked. She has never been exposed to tobacco smoke. She has never used smokeless tobacco. She reports current alcohol use of about 1.0 standard drink of alcohol per week. She reports that she does not use drugs.    Family History  Family History   Problem Relation Name Age of Onset    Hypertension Mother      Hyperlipidemia Mother      Other (MORBID OBESITY) Mother      Diabetes Mother      Hypertension Father      Hyperlipidemia Father      Other (MORBID OBESITY) Father      Diabetes Father      Leukemia Other GRANDFATHER     Cancer Other GRANDMOTHER         RENAL/KIDNEY    Melanoma Other GRANDMOTHER         Allergies  Patient has no known allergies.    Review of Systems    Review of Systems   Constitutional:  Negative for chills and fever.   HENT:  Negative for dental problem.    Respiratory:  Negative for cough and shortness of breath.    Gastrointestinal:  Negative for abdominal pain, constipation, diarrhea and nausea.   Genitourinary:  Negative for difficulty urinating, dysuria and frequency.   Musculoskeletal:  Positive for arthralgias and back pain.   Neurological:  Negative for weakness and headaches.         Physical Exam  Physical Exam  Constitutional:       General: She is not in acute distress.     Appearance: She is obese.   Cardiovascular:      Rate and Rhythm: Normal rate and regular  rhythm.      Heart sounds: Normal heart sounds.   Pulmonary:      Breath sounds: Normal breath sounds.   Abdominal:      Palpations: Abdomen is soft.      Tenderness: There is no abdominal tenderness.   Musculoskeletal:      Cervical back: No tenderness.      Right lower leg: No edema.      Left lower leg: No edema.   Neurological:      Mental Status: She is alert and oriented to person, place, and time.      Gait: Gait is intact. Gait normal.   Psychiatric:         Mood and Affect: Mood normal.         Behavior: Behavior normal.              Assessment and plan  GERD- will continue PPI  Sleep apnea- will use APAP

## 2024-10-08 NOTE — PROGRESS NOTES
"Sindhu Herron is a 29 y.o. female on day 1 of admission presenting with Morbid obesity (Multi).    Subjective   Sindhu is sitting in the chair. She denies chest pain or SOB. She has minimal incisional pain. She is tolerating her diet.        Objective     Physical Exam  Constitutional:       Appearance: Normal appearance.   Cardiovascular:      Rate and Rhythm: Normal rate.   Pulmonary:      Effort: Pulmonary effort is normal.   Abdominal:      Palpations: Abdomen is soft.      Tenderness: There is no abdominal tenderness.      Comments: Incisions intact.    Neurological:      Mental Status: She is alert and oriented to person, place, and time.         Last Recorded Vitals  Blood pressure 108/54, pulse 70, temperature 36.8 °C (98.2 °F), temperature source Oral, resp. rate 17, height 1.657 m (5' 5.24\"), weight 133 kg (292 lb 5.3 oz), SpO2 95%, not currently breastfeeding.  Intake/Output last 3 Shifts:  I/O last 3 completed shifts:  In: 2531.3 (19.1 mL/kg) [I.V.:2231.3 (16.8 mL/kg); IV Piggyback:300]  Out: 810 (6.1 mL/kg) [Urine:800 (0.2 mL/kg/hr); Blood:10]  Weight: 132.6 kg     Relevant Results  UGI WNL    Assessment/Plan   Assessment & Plan  Morbid obesity (Multi)    Morbid obesity with BMI of 45.0-49.9, adult (Multi)    Sindhu and I reviewed the rate at which to drink her fluids and her fluid goal for the day. I encouraged her to continue ambulating and use her IS every hour.        I spent 15 minutes in the professional and overall care of this patient.      Deloris Sandoval, APRN-CNP      "

## 2024-10-08 NOTE — CARE PLAN
The patient's goals for the shift include  tolerate liquids and go home    The clinical goals for the shift include IV fluids and pain control

## 2024-10-08 NOTE — CONSULTS
Pulmonary Medicine Consult    Admitted on:     10/7/2024 Length of Stay:   1 day(s)     History of Present Illness     29 y.o. female with a PMH of morbid obesity, obstructive sleep apnea, hypercholestremia and GERD admitted to the hospital for Laparoscopic gastric bypass surgery.  Patient is POD#1 and post op course has so far been uneventful.    ROS:  No fevers, pain well controlled  Tolerating oral feeds  Ambulating well  No cough or sob or respiratory distress    Objective   Previous History     Medical History    She has a past medical history of Anxiety disorder, unspecified, Bipolar 1 disorder (Multi), Foot pain, bilateral (01/23/2023), GERD (gastroesophageal reflux disease), High cholesterol, Hypercholesterolemia, Ingrown toenail of both feet (01/23/2023), and Sleep apnea.    Surgical History  Past Surgical History:   Procedure Laterality Date    WISDOM TOOTH EXTRACTION       Allergies  No Known Allergies    Home Medications  Current Outpatient Medications   Medication Instructions    acetaminophen (TYLENOL) 650 mg, oral, Every 4 hours PRN, Take over the counter liquid tylenol as needed. Dilute with equal parts water to prevent dumping syndrome.    cholecalciferol (VITAMIN D-3) 50 mcg, oral, Daily    escitalopram (Lexapro) 20 mg tablet 1 tablet, oral, Daily    ferrous sulfate 325 (65 Fe) MG EC tablet 1 tablet, oral, Daily with breakfast, Do not crush, chew, or split.    hydrOXYzine HCL (ATARAX) 10 mg, TAKES 20 MG    lamoTRIgine (LAMICTAL) 200 mg, oral, Daily    norgestrel-ethinyl estradioL (Low-Ogestrel, 28,) 0.3-30 mg-mcg tablet 1 tablet, oral, Daily    omeprazole (PRILOSEC) 40 mg, oral, Daily before breakfast, Do not crush or chew.    oxyCODONE (ROXICODONE) 5 mg, oral, Every 6 hours PRN, Dilute with equal parts water to prevent dumping syndrome.    spironolactone (ALDACTONE) 100 mg, oral, Daily    tretinoin (Retin-A) 0.05 % cream Topical     Social History  Social History     Tobacco Use   Smoking Status  "Never    Passive exposure: Never   Smokeless Tobacco Never       reports current alcohol use of about 1.0 standard drink of alcohol per week.    reports no history of drug use.     Family History  family history includes Cancer in an other family member; Diabetes in her father and mother; Hyperlipidemia in her father and mother; Hypertension in her father and mother; Leukemia in an other family member; MORBID OBESITY in her father and mother; Melanoma in an other family member.    Objective     Vitals:    10/08/24 0600   Weight: 133 kg (292 lb 5.3 oz)   Body mass index is 48.29 kg/m².        10/7/2024     7:00 PM 10/7/2024     7:18 PM 10/7/2024    11:47 PM 10/8/2024     4:23 AM 10/8/2024     6:00 AM 10/8/2024     7:25 AM 10/8/2024    12:24 PM   Vitals   Systolic 127 123 120 109  108 129   Diastolic 78 68 69 69  54 74   Heart Rate 102 98 80   70 55   Temp  37 °C (98.6 °F) 36.8 °C (98.2 °F) 37 °C (98.6 °F)  36.8 °C (98.2 °F) 36.8 °C (98.2 °F)   Resp 15 18 18 18  17 18   Height (in)  1.657 m (5' 5.24\")        Weight (lb)  294.53   292.33     BMI  48.66 kg/m2   48.29 kg/m2     BSA (m2)  2.48 m2   2.47 m2          Vent settings:  FiO2 (%):  [21 %] 21 %    Intake/Output Summary (Last 24 hours) at 10/8/2024 1615  Last data filed at 10/8/2024 1500  Gross per 24 hour   Intake 3702.25 ml   Output 1510 ml   Net 2192.25 ml       Awake and responsive.  Patient was speaking with the surgical NP at the time of the visit.  Patient was discharged shortly after.  A physical examination could not be performed/    Medications     Scheduled Medications:   escitalopram, 20 mg, oral, Daily  heparin (porcine), 5,000 Units, subcutaneous, q8h  lamoTRIgine, 200 mg, oral, Daily  oxygen, , inhalation, Continuous - 02/gases  oxygen, 2 L/min, inhalation, Continuous - 02/gases  pantoprazole, 40 mg, intravenous, Daily before breakfast       Continuous Medications:   lactated Ringer's, 125 mL/hr, Last Rate: 125 mL/hr (10/08/24 1254)       PRN " "Medications:     Labs                             No results found for: \"BLOODCULT\", \"GRAMSTAIN\"  No results found for: \"URINECULTURE\"    Imaging and Diagnostic Studies     Recent imaging and diagnostic studies reviewed.       Assessment / Plan   29 y.o. female with a PMH of morbid obesity, obstructive sleep apnea, hypercholestremia and GERD admitted to the hospital for Laparoscopic gastric bypass surgery.  Patient is POD#1 and post op course has so far been uneventful.      1) POD#1 post gastric sleeve   3) H/o morbid obesity and ROBERT - on CPAP     Plan:  - Continue to wean oxygen  - Continue Incentive spirometry q1h when awake  - Encourage mobilization and OOB  - Continue home CPAP therapy when taking naps and at night.  - DVT prophylaxis.           Mayank Ruiz MD      "

## 2024-10-08 NOTE — NURSING NOTE
Rounded on pt. Pt laying in bed, eyes closed, respirations even and unlabored. Pt appears to be sleeping and in no apparent pain or distress. Abdominal punctures unchanged since prior assessment. Pt had two walks this shift, so far. Uneventful night with no changes since prior assessment. Call bell and belongings are placed in reach. Telemetry leads connected and reading on monitor. Home bipap/cpap in place with no audible leaks or alarms. Bed alarm set. Continuing to monitor.

## 2024-10-08 NOTE — NURSING NOTE
Rounded on pt. Pt laying in bed, eyes closed, respirations even and unlabored. Pt appears to be sleeping and in no apparent pain or distress. Abdominal punctures unchanged since prior assessment. Call bell and belongings are placed in reach. Telemetry leads connected and reading on monitor. Home bipap/cpap in place with no audible leaks or alarms. Bed alarm set. Continuing to monitor.

## 2024-10-08 NOTE — CONSULTS
Nutrition Consult/Progress Note    Consult received for pt having gastric bypass surgery.     Will defer assessment  to Dr. Mora's team.

## 2024-10-08 NOTE — DISCHARGE INSTRUCTIONS
No bending, pushing, pulling, lifting more than 10lbs, twisting for 6 weeks. Walk at least every hour during the day.    Face away from the water when showering, pat incision dry. Do not submerge incision until okayed by office. Do not rub any lotions or moisturizers on incisions.    Take sips of noncarbonated, low calorie, low to no sugar liquids every 3 to 5 minutes for a goal of 50- 60 oz per day (6-8 cups). Add 1 protein shake per day beginning in the evening 1-2 days after discharge. Increase to 2 protein shakes per day in the second week for a goal of 50-60 grams of protein per day. Make it the last liquid of the day. NO straws, gum, jello, milkshakes, or icecream.    If no CPAP at home, sleep upright and do not take any narcotics for several hours prior to napping or sleeping    Crush all medications and open all capsules for 6 weeks. Mix the beads for a capsule in milk or a protein shake, not water, to prevent clumping. Do not drive while taking pain medication. You can take the patch off behind your ear on Thursday 10/10. Please make sure you wash your hands right after. Medication residue left on your hands can cause blurred vision.     Follow instructions: Sips of noncarbonated low to no calorie, low to no sugar liquids every three minutes for a goal of 6-8 glasses of fluid per day. Walk for 2-3 minutes every hour. Use the incentive spirometer 10 times per hour while awake. Add one protein shake per day start Thursday or Friday with a goal of 2 shakes per day starting the second week.       Home-going Instructions    #1 Fluids             drink 50 - 65 oz of non-caffeinated fluids daily  #2 Walk               walk 3 - 5 minutes every hour during the day  #3 Spirometer   use the incentive spirometer 10 times and hour during the day  #4 Protein          after drinking 50oz of fluids begin drinking protein    Wound Care  Do not submerge incisions in pool, bath, or hot tub  Do not peel off Dermabond -it will  gradually loosen and fall off  You may shower and pat incisions dry  Do not apply any lotions, creams, or ointments to your incisions  Activity  Do not push, pull, or lift.  Avoid excessive bending and twisting at the waist.  You may walk stairs.  You may sleep in any position that feels comfortable.  You must get up and walk every hour during the day.  If you plan to take a nap during the day, set an alarm for one hour so you will get up and walk around.  Potential Complications   Wound Infection - redness, increased warmth, pain, thick drainage, fever  Blood Clot/Pulmonary Embolism - calf pain or swelling, chest pain, shortness of breath, racing heart, fast breathing  Leak - abdominal pain radiating down the left side (after eating/drinking), fever, fast breathing, or rapid heart rate.   CALL 9-1-1 WITH ANY CHEST PAIN OR SHORTNESS OF BREATH, otherwise call the office with any concerns. [phone]  Medications  All medications need to be crushable, chewable, in liquid form or open capsules.   CANNOT crush extended release meds.  You must begin taking your stomach acid reducing medication the morning after you are discharged from the hospital.  Start your chewable or liquid multivitamin tomorrow.  Diet  Full liquid.  NO CARBONATION.  Must be thin enough to go up a small stirrer-type straw. BUT DO NOT USE STRAWS.  Remember first goal is to drink at least 50 oz. fluid every day.  Increase your protein as tolerated for a goal of 50 grams of protein daily.  Refer to your manual for explicit instructions.  Do not drink any fluid that has more than 25 grams of carbohydrate per 8 oz.  This will prevent dumping.  Follow-up     2 weeks

## 2024-10-09 ENCOUNTER — PATIENT OUTREACH (OUTPATIENT)
Dept: CARE COORDINATION | Facility: CLINIC | Age: 29
End: 2024-10-09
Payer: COMMERCIAL

## 2024-10-09 ENCOUNTER — PATIENT MESSAGE (OUTPATIENT)
Dept: CARE COORDINATION | Facility: CLINIC | Age: 29
End: 2024-10-09

## 2024-10-09 NOTE — PROGRESS NOTES
[facility] member [facility] outreach.    Member returned call. I introduced myself and purpose of call.  Confirmed : Yes  No new or worsening symptoms. P=4/10, denies N/V/F/SOB.  Member has Rx given at discharge.  Confident in reaching fluid intake goal.   Reviewed signs of infection.  Has AVS and access to MyChart.  Hasn't been to see PCP in a while, has been followed by GYN. Encouraged establishing care with PCP. Will provide some in her area.  Agreeable to receiving follow up call in 2 weeks and 30 days.  Do you have any questions or concerns. Trying to get estimate for procedure.   Reviewed Healthy  and is interested.  Lillian FARR, [facility] Population Health  Office Phone: [phone]     Engagement  Call Start Time: 1103 (10/9/2024 11:19 AM)    Medications  Medications reviewed with patient/caregiver?: Yes (10/9/2024 11:19 AM)  Is the patient having any side effects they believe may be caused by any medication additions or changes?: No (10/9/2024 11:19 AM)  Does the patient have all medications ordered at discharge?: Yes (10/9/2024 11:19 AM)  Care Management Interventions: No intervention needed (10/9/2024 11:19 AM)  Is the patient taking all medications as directed (includes completed medication regime)?: Yes (10/9/2024 11:19 AM)    Appointments  Does the patient have a primary care provider?: Yes (Has not seen in a ild, using OB/GYN as PCP. Encouraged PCP.) (10/9/2024 11:19 AM)  Has the patient kept scheduled appointments due by today?: Not applicable (10/9/2024 11:19 AM)    Patient Teaching  Does the patient have access to their discharge instructions?: Yes (10/9/2024 11:19 AM)  Care Management Interventions: Reviewed instructions with patient (10/9/2024 11:19 AM)  What is the patient's perception of their health status since discharge?: Improving (10/9/2024 11:19 AM)

## 2024-10-09 NOTE — PROGRESS NOTES
[facility] outreach. Left message requesting return call.   Gastric bypass. Outreach to offer assistance if needed and she ow member is following discharge.  Lillian FARR, [facility]  Office Phone: [phone]

## 2024-10-11 ENCOUNTER — DOCUMENTATION (OUTPATIENT)
Dept: SURGERY | Facility: CLINIC | Age: 29
End: 2024-10-11
Payer: COMMERCIAL

## 2024-10-11 NOTE — PROGRESS NOTES
09:47 left message    1429:  Bariatric Surg Post-op Call:          Date: 10/11/2024         Called by:  Lori         How many ounces of fluid are you drinking? 50oz yesterday, 30oz today so far         How many grams of protein are you drinking? 15 g yesterday         Still taking pain medication? tylenol         Are you taking something to prevent blood clots? no         Are you walking every hour? yes

## 2024-10-15 NOTE — PROGRESS NOTES
2 WK POST OP RYGB  HPI:     General Comments:          Do you have any difficulties with:  swallowing?  No, nausea?  No, vomiting?  No, pain?  No, heartburn?  No, discomfort?  No, intolerances?  No.   Fluid and Protein Intake:    Fluid intake (ounces):   60       Sources:,   Protein intake (grams): 40-50  Sources:.   Chewable MVI:  Taking as directed?  Yes.   PPI/omeprazole:  Taking as directed/prescribed?  Yes.   Walking:  Every hour?  Yes.   Gallbladder:  Removed?  No.   Primary Care Physician:  Have you seen?  No,   Have appointment scheduled?  INSTRUCTED TO FOLLOW UP     -Bariatric Follow-up:          Receive IV fluids  No. Seen in ER with admission No. Seen ER without admission No. Hospital readmission No.          Medical History:          Objective:        Physical Examination:       Incisions closed. No erythema. No drainage.         Assessment:        Assessment:    1. Surgery follow-up examination - Z09 (Primary)      Plan:        1. Others    Notes:  Advance diet per protocol  Continue PPI until 3 months  Continue MVI with Fe  May increase walking as tolerated, can start cardio if able to compensate increased fluid loss.              Preventive Medicine:      Counseling:  Medication education:  Education:  All new and/or current medications discussed and reviewed including side effects with patient/caregiver, Understanding:  Caregiver/Patient expressed understanding., Adherence:  Barriers to adherence identified and discussed if present. BMI Care goal follow up plan:  Above normal BMI management provided  Dietary management education, guidance, and counseling.           Follow Up: 2 Weeks                   Billing Information:         Visit Code:        Procedure Codes:

## 2024-10-17 ENCOUNTER — PATIENT OUTREACH (OUTPATIENT)
Dept: CARE COORDINATION | Facility: CLINIC | Age: 29
End: 2024-10-17
Payer: COMMERCIAL

## 2024-10-17 NOTE — PROGRESS NOTES
[facility] follow up call. Left message requesting return call. Checking on progress. Any needs. Any PCP appointment?  Lillian FARR, [facility] Population Health  Office Phone: [phone]

## 2024-10-21 ENCOUNTER — APPOINTMENT (OUTPATIENT)
Dept: SURGERY | Facility: CLINIC | Age: 29
End: 2024-10-21
Payer: COMMERCIAL

## 2024-10-21 VITALS
SYSTOLIC BLOOD PRESSURE: 112 MMHG | BODY MASS INDEX: 43.99 KG/M2 | WEIGHT: 264 LBS | HEART RATE: 82 BPM | DIASTOLIC BLOOD PRESSURE: 71 MMHG | HEIGHT: 65 IN

## 2024-10-21 DIAGNOSIS — Z09 SURGERY FOLLOW-UP EXAMINATION: Primary | ICD-10-CM

## 2024-10-21 RX ORDER — BISMUTH SUBSALICYLATE 262 MG
1 TABLET,CHEWABLE ORAL DAILY
COMMUNITY

## 2024-10-21 RX ORDER — URSODIOL 300 MG/1
300 CAPSULE ORAL 2 TIMES DAILY
Qty: 60 CAPSULE | Refills: 6 | Status: SHIPPED | OUTPATIENT
Start: 2024-10-21 | End: 2025-05-19

## 2024-10-21 ASSESSMENT — COLUMBIA-SUICIDE SEVERITY RATING SCALE - C-SSRS
2. HAVE YOU ACTUALLY HAD ANY THOUGHTS OF KILLING YOURSELF?: NO
1. IN THE PAST MONTH, HAVE YOU WISHED YOU WERE DEAD OR WISHED YOU COULD GO TO SLEEP AND NOT WAKE UP?: NO
6. HAVE YOU EVER DONE ANYTHING, STARTED TO DO ANYTHING, OR PREPARED TO DO ANYTHING TO END YOUR LIFE?: NO

## 2024-10-21 ASSESSMENT — PATIENT HEALTH QUESTIONNAIRE - PHQ9
2. FEELING DOWN, DEPRESSED OR HOPELESS: NOT AT ALL
SUM OF ALL RESPONSES TO PHQ9 QUESTIONS 1 AND 2: 0
1. LITTLE INTEREST OR PLEASURE IN DOING THINGS: NOT AT ALL

## 2024-10-21 ASSESSMENT — PAIN SCALES - GENERAL: PAINLEVEL_OUTOF10: 0-NO PAIN

## 2024-10-21 NOTE — PROGRESS NOTES
"Subjective   Patient ID: Sindhu Herron is a 29 y.o. female who presents for Post-op (2 WK POST OP RYGB).  HPI  Sindhu is here for her 2 week follow up. She denies reflux. She is drinking 50-60oz of fluid per day not including her protein. She is getting 40-50g of protein per day. She is up walking all the time. She is currently taking Celebrate 45 MVI and omeprazole. She has a gallbladder.     Objective   Physical Exam  Constitutional:       General: She is not in acute distress.     Appearance: Normal appearance.   HENT:      Head: Normocephalic.   Eyes:      Pupils: Pupils are equal, round, and reactive to light.   Cardiovascular:      Rate and Rhythm: Normal rate and regular rhythm.   Pulmonary:      Effort: Pulmonary effort is normal.   Abdominal:      General: There is no distension.      Palpations: Abdomen is soft.   Musculoskeletal:         General: Normal range of motion.   Skin:     General: Skin is warm and dry.   Neurological:      Mental Status: She is alert and oriented to person, place, and time.   Psychiatric:         Mood and Affect: Mood normal.         Assessment/Plan   Problem List Items Addressed This Visit             ICD-10-CM    Surgery follow-up examination - Primary Z09    Relevant Medications    ursodiol (Actigall) 300 mg capsule     Start ursodiol twice daily. Mix with sugar free jam or jelly a few days after starting pureed diet. Continue activity restrictions. Continue to crush pills. May face shower. Advance diet per protocol. Continue MVI with Fe, PPI. Reviewed the \"rules\" for long term weight loss success. Continue to increase walking for exercise.    SANTHOSH Nieto-CNP 10/21/24 2:16 PM   "

## 2024-10-21 NOTE — PROGRESS NOTES
2 week Post-op Appointment - Dietary Follow Up     Current Weight: 264 lbs   Current BMI: 43.60    Reviewed pureed diet progression. Patient was instructed to start purees on Tuesday, 10/22. I reviewed how to blend foods appropriately to one consistency using a . Pureed food examples and recipes were provided in packet, reviewed foods to avoid at this time. Informed patient to measure out portion sizes and track volume size. Informed patient to aim for at least 60g of protein per day and to add protein shake as needed. Encouraged continuing to prioritize fluids and ensure getting in at least 50-60 oz fluid daily. Reviewed the importance of slowly eating and stop when starting to feel full. Patient was instructed to follow pureed diet for a full 2 weeks, and to not progress diet until instructed to do so. Pureed education packet was given, patient shows good understanding.       Sagrario Mena RD, LDN  Registered Dietitian, Licensed Dietitian Nutritionist

## 2024-10-23 NOTE — PROGRESS NOTES
"4 WK POST OP  RYGB  START WT:  275 IDEAL WT: 151 START EXCESS:  124   HT: 65.25 in    HPI:     General Comments:          Do you have any difficulties with:  swallowing?  No,   nausea?  yes, vomiting?  yes, pain?  No, heartburn?  No, discomfort?  yes, intolerances?chicken,   eggs   Fluid and Protein Intake:  Reviewed fluid and protein log:  Yes,   Fluid intake (ounces):  60 Sources:,   Protein intake (grams): 40  Sources:.   Daily Diet Recall: ----. Volume of food at a time: pureed.   Chewable MVI:  Taking as directed?  Yes.   PPI/omeprazole:  Taking as directed/prescribed?  Yes.   Walking:  Every hour?  Yes.   Ursodiol:  Taking as directed/prescribed?  Yes.      -Bariatric Follow-up:          Receive IV fluids  No. Seen in ER with admission No. Seen ER without admission No. Hospital readmission No.          Medical History:          Objective:        Physical Examination:       Incisions healed.         Assessment:        Assessment:    1. Surgery follow-up examination - Z09      Plan:        1. Others    Notes: Advance diet per protocol  Continue MVI with Fe, PPI  Reviewed the \"rules\" for long term weight loss success.  Continue to increase walking for exercise..              Preventive Medicine:      Counseling:  Medication education:  Education:  All new and/or current medications discussed and reviewed including side effects with patient/caregiver, Understanding:  Caregiver/Patient expressed understanding., Adherence:  Barriers to adherence identified and discussed if present. BMI Care goal follow up plan:  Above normal BMI management provided  Dietary management education, guidance, and counseling.           Follow Up: 2 Weeks                   Billing Information:         Visit Code:        Procedure Codes:       "

## 2024-11-04 ENCOUNTER — APPOINTMENT (OUTPATIENT)
Dept: SURGERY | Facility: CLINIC | Age: 29
End: 2024-11-04
Payer: COMMERCIAL

## 2024-11-04 VITALS
SYSTOLIC BLOOD PRESSURE: 106 MMHG | BODY MASS INDEX: 42.49 KG/M2 | HEART RATE: 76 BPM | HEIGHT: 65 IN | DIASTOLIC BLOOD PRESSURE: 69 MMHG | WEIGHT: 255 LBS

## 2024-11-04 DIAGNOSIS — Z09 SURGERY FOLLOW-UP EXAMINATION: Primary | ICD-10-CM

## 2024-11-04 ASSESSMENT — PAIN SCALES - GENERAL: PAINLEVEL_OUTOF10: 0-NO PAIN

## 2024-11-04 NOTE — PROGRESS NOTES
"Subjective   Patient ID: Sindhu Herron is a 29 y.o. female who presents for Post-op (4 wk post op rygb).  HPI  Sindhu is here for her 4 week follow up. She denies reflux. She is drinking 60oz of fluid per day not including her protein. She is getting 40g of protein per day. She is up walking all the time. She is currently taking Celebrate 45 MVI, ursodiol, and omeprazole.     Objective   Physical Exam  Constitutional:       General: She is not in acute distress.     Appearance: Normal appearance. She is obese.   HENT:      Head: Normocephalic.   Eyes:      Pupils: Pupils are equal, round, and reactive to light.   Cardiovascular:      Rate and Rhythm: Normal rate and regular rhythm.   Pulmonary:      Effort: Pulmonary effort is normal.   Abdominal:      General: There is no distension.      Palpations: Abdomen is soft.      Comments: Appropriately TTP around incisions, incisions CDI and closed with glue   Musculoskeletal:         General: Normal range of motion.   Skin:     General: Skin is warm and dry.   Neurological:      Mental Status: She is alert and oriented to person, place, and time.   Psychiatric:         Mood and Affect: Mood normal.         Assessment/Plan   Problem List Items Addressed This Visit             ICD-10-CM    Surgery follow-up examination - Primary Z09     Continue activity restrictions. Continue to crush pills. Advance diet per protocol. Continue MVI with Fe, PPI. Reviewed the \"rules\" for long term weight loss success. Continue to increase walking for exercise.    SANTHOSH Nieto-CNP 11/04/24 9:22 AM   "

## 2024-11-04 NOTE — PROGRESS NOTES
4 week Post-op Appointment - Dietary Follow Up     Current Weight: 255 lbs   Current BMI: 42.11     Reviewed soft food diet progression. Patient was instructed to start soft food stage 1 on Tuesday, 11/5. I reviewed which foods patient can start to incorporate and which foods to avoid. Instructed to start soft food stage 2 on Tuesday, 11/12. Informed patient to measure out portion sizes and track volume size. Informed patient to aim for at least 60g of protein per day and to add protein shake as needed. Encouraged continuing to prioritize fluids and ensure getting in at least 50-60 oz fluid daily. Reviewed the importance of slowly eating and stop when starting to feel full. Patient was instructed to follow the soft diet for a full 2 weeks, and to not progress diet until instructed to do so. Soft food education packet was given, patient shows good understanding.         Sagrario Mena RD, LDN  Registered Dietitian, Licensed Dietitian Nutritionist

## 2024-11-13 ENCOUNTER — PATIENT OUTREACH (OUTPATIENT)
Dept: CARE COORDINATION | Facility: CLINIC | Age: 29
End: 2024-11-13
Payer: COMMERCIAL

## 2024-11-13 NOTE — PROGRESS NOTES
"6 WK POST OP RYGB  START WT:  275 IDEAL WT: 151 START EXCESS:  124   HT: 65.25 IN  Chief Complaints:         1. PBS:6 wk f/u.          HPI:     General Comments:          Do you have any difficulties with:    swallowing?  No,   nausea?  No, vomiting?  No,   pain?  No,   heartburn?  No,   discomfort?  No,   intolerances?  No.   Fluid and Protein Intake:    Reviewed fluid and protein log:  Yes,   Fluid intake (ounces): 60         Sources:,   Protein intake (grams): 40-50  Sources:.   Daily Diet Recall: ----. Volume of food at a time: soft.   Chewable MVI:  Taking as directed?  Yes.   PPI/omeprazole:    Taking as directed/prescribed?  Yes.   Walking:  Every hour?  Yes.   Ursodiol:  Taking as directed/prescribed?  Yes.      -Bariatric Follow-up:          Receive IV fluids  No. Seen in ER with admission No.   Seen ER without admission No. Hospital readmission No.           Medical History:          Objective:       Assessment:        Assessment:    1. Surgery follow-up examination - Z09   2. H/O bariatric surgery - Z98.84   3. Abnormal intestinal absorption - K90.9   4. Vitamin D deficiency, unspecified - E55.9   5. B12 deficiency - E53.8   6. Hyperparathyroidism - E21.3      Plan:        1. Others    Notes:  Advance diet per protocol  May swallow whole pills  Reviewed the \"rules\" for long-term weight loss success  Continue PPI, MVI with Fe  Begin micronutrient supplement protocol/handout given  Activity restrictions lifted  Discussed the importance of regular exercise for continued long-term weight loss success  .              Preventive Medicine:      Counseling:  Medication education:  Education:  All new and/or current medications discussed and reviewed including side effects with patient/caregiver, Understanding:  Caregiver/Patient expressed understanding., Adherence:  Barriers to adherence identified and discussed if present. BMI Care goal follow up plan:  Above normal BMI management provided  Dietary management " education, guidance, and counseling.           Follow Up: 6 Weeks

## 2024-11-13 NOTE — PROGRESS NOTES
EHP CATHY 30 follow up outreach. Left message requesting return call.   Lillian FARR, [facility] Population [facility]  Office Phone: [phone]

## 2024-11-18 ENCOUNTER — APPOINTMENT (OUTPATIENT)
Dept: SURGERY | Facility: CLINIC | Age: 29
End: 2024-11-18
Payer: COMMERCIAL

## 2024-11-18 ENCOUNTER — PATIENT OUTREACH (OUTPATIENT)
Dept: CARE COORDINATION | Facility: CLINIC | Age: 29
End: 2024-11-18

## 2024-11-18 VITALS
DIASTOLIC BLOOD PRESSURE: 76 MMHG | WEIGHT: 256 LBS | HEART RATE: 63 BPM | BODY MASS INDEX: 42.65 KG/M2 | HEIGHT: 65 IN | SYSTOLIC BLOOD PRESSURE: 113 MMHG

## 2024-11-18 DIAGNOSIS — E21.3 HYPERPARATHYROIDISM (MULTI): ICD-10-CM

## 2024-11-18 DIAGNOSIS — Z09 SURGERY FOLLOW-UP EXAMINATION: Primary | ICD-10-CM

## 2024-11-18 DIAGNOSIS — E55.9 VITAMIN D DEFICIENCY: ICD-10-CM

## 2024-11-18 DIAGNOSIS — K90.9 INTESTINAL MALABSORPTION, UNSPECIFIED TYPE (HHS-HCC): ICD-10-CM

## 2024-11-18 DIAGNOSIS — Z98.84 H/O BARIATRIC SURGERY: ICD-10-CM

## 2024-11-18 DIAGNOSIS — E53.8 VITAMIN B12 DEFICIENCY: ICD-10-CM

## 2024-11-18 PROCEDURE — 3008F BODY MASS INDEX DOCD: CPT

## 2024-11-18 PROCEDURE — 99024 POSTOP FOLLOW-UP VISIT: CPT

## 2024-11-18 ASSESSMENT — PAIN SCALES - GENERAL: PAINLEVEL_OUTOF10: 0-NO PAIN

## 2024-11-18 NOTE — PROGRESS NOTES
EHP CATHY 30 day outreach follow up. Left message requesting return call.   Contact info provided.    Lillian FARR, [facility]  Office Phone: [phone]

## 2024-11-18 NOTE — PROGRESS NOTES
"Subjective   Patient ID: Sindhu Herron is a 29 y.o. female who presents for Post-op (6 wk post op rygb).  HPI  Sindhu is here for her 4 week follow up. She denies reflux. She is drinking 60oz of fluid per day not including her protein. She is getting 40-50g of protein per day. She is up walking as recommended. She is currently taking Celebrate 45 MVI, ursodiol, and omeprazole.       Objective   Physical Exam  Constitutional:       Appearance: Normal appearance.   Eyes:      Pupils: Pupils are equal, round, and reactive to light.   Cardiovascular:      Rate and Rhythm: Normal rate.   Pulmonary:      Effort: Pulmonary effort is normal.   Abdominal:      Palpations: Abdomen is soft.   Musculoskeletal:         General: Normal range of motion.   Skin:     General: Skin is warm and dry.   Neurological:      General: No focal deficit present.      Mental Status: She is alert and oriented to person, place, and time.   Psychiatric:         Mood and Affect: Mood normal.         Assessment/Plan   Problem List Items Addressed This Visit             ICD-10-CM    Surgery follow-up examination - Primary Z09    Intestinal malabsorption K90.9    Relevant Orders    CBC and Auto Differential    Comprehensive Metabolic Panel    Copper, Blood    Ferritin    Iron and TIBC    Folate    Parathyroid Hormone, Intact    Vitamin B1, Whole Blood    Vitamin B12    Vitamin D 25-Hydroxy,Total (for eval of Vitamin D levels)    Zinc, Serum or Plasma    Hyperparathyroidism (Multi) E21.3    Relevant Orders    Parathyroid Hormone, Intact    Vitamin B12 deficiency E53.8    Relevant Orders    Vitamin B12    Vitamin D deficiency E55.9    Relevant Orders    Vitamin D 25-Hydroxy,Total (for eval of Vitamin D levels)    H/O bariatric surgery Z98.84     Start calcium citrate BID,  from multivitamin by at least 2 hours. Activity restrictions lifted. Advance diet per protocol. Continue MVI with Fe, PPI. Reviewed the \"rules\" for long term weight loss " success. Continue to increase walking for exercise.    Deloris Sandoval, SANTHOSH-CNP 11/18/24 4:08 PM

## 2024-11-18 NOTE — PROGRESS NOTES
6 week Post-op Appointment - Dietary Follow Up    Current Weight: 256 lbs  Current BMI: 42.27    Reviewed 6 week diet progression. I reviewed which foods patient can start to incorporate and which foods to avoid. I reviewed timeline with patient over the next 6 weeks on which foods can be reintroduced back into the diet. Informed patient to measure out portion sizes and track volume size. Informed patient to aim for at least 60g of protein per day and continue to prioritize fluids and ensure getting in at least 50-60 oz fluid daily. Reviewed the importance of slowly eating and stop when starting to feel full. 6 week diet education packet was given. Reviewed Vitamin and Mineral supplementation to start taking at 6 weeks post-op in addition to daily MVI. Patient shows good understanding.         Sagrario Mena RD, LDN  Registered Dietitian, Licensed Dietitian Nutritionist

## 2024-11-19 PROBLEM — E53.8 VITAMIN B12 DEFICIENCY: Status: ACTIVE | Noted: 2024-11-19

## 2024-11-19 PROBLEM — E55.9 VITAMIN D DEFICIENCY: Status: ACTIVE | Noted: 2024-11-19

## 2024-11-19 PROBLEM — K90.9 INTESTINAL MALABSORPTION: Status: ACTIVE | Noted: 2024-11-19

## 2024-11-19 PROBLEM — Z98.84 H/O BARIATRIC SURGERY: Status: ACTIVE | Noted: 2024-11-19

## 2024-11-19 PROBLEM — E21.3 HYPERPARATHYROIDISM (MULTI): Status: ACTIVE | Noted: 2024-11-19

## 2025-01-09 ENCOUNTER — APPOINTMENT (OUTPATIENT)
Dept: SURGERY | Facility: CLINIC | Age: 30
End: 2025-01-09
Payer: COMMERCIAL

## 2025-01-23 ENCOUNTER — APPOINTMENT (OUTPATIENT)
Dept: SURGERY | Facility: CLINIC | Age: 30
End: 2025-01-23
Payer: COMMERCIAL

## 2025-05-01 ENCOUNTER — OFFICE VISIT (OUTPATIENT)
Dept: OBSTETRICS AND GYNECOLOGY | Facility: CLINIC | Age: 30
End: 2025-05-01
Payer: COMMERCIAL

## 2025-05-01 VITALS
SYSTOLIC BLOOD PRESSURE: 110 MMHG | BODY MASS INDEX: 40.45 KG/M2 | HEIGHT: 65 IN | WEIGHT: 242.8 LBS | DIASTOLIC BLOOD PRESSURE: 74 MMHG

## 2025-05-01 DIAGNOSIS — N91.1 AMENORRHEA, SECONDARY: Primary | ICD-10-CM

## 2025-05-01 DIAGNOSIS — Z31.69 INFERTILITY COUNSELING: ICD-10-CM

## 2025-05-01 RX ORDER — MEDROXYPROGESTERONE ACETATE 10 MG/1
10 TABLET ORAL DAILY
Qty: 10 TABLET | Refills: 0 | Status: SHIPPED | OUTPATIENT
Start: 2025-05-01 | End: 2025-05-11

## 2025-05-01 RX ORDER — HYDROXYZINE HYDROCHLORIDE 10 MG/1
10 TABLET, FILM COATED ORAL ONCE
COMMUNITY

## 2025-05-01 NOTE — PROGRESS NOTES
"GYNECOLOGY PROGRESS NOTE          CC:   see below. Since menarche she had irregular menses. Then at 20 she was started on OCPS until age 29 and she stopped them she she has her gastric surgery. 10/2024 gastric surgery and she has lost 50bs. She had regular menses while on her OCPS.  After the surgery she has a regular menses in Nov- Feb. Last menses was 2/25 and no bleeding since. She has has some cramping. UPT negative. She has hoped after her surgery that her menses would remain regular. Patient and  have been trying since Jan to achieve a pregnancy. Patient does have some hair noted along the her chin. D/O PCOS testing and affects to menses/fertility. D/O fertility testing. D/O inducing a menses verses regulation with OCPS.   Chief Complaint   Patient presents with    Follow-up     Patient here today mentioned has not had a period since 2/2025 - late March early April did have some cramping with some discharge and felt super fatigued.  At present time no discharge.   Patient states she has done some home pregnancy tests and all came neg.          HPI:  Sindhu Herron is here with new complaint of no menses since 2/2025.      ROS:  GYN - see HPI        PHYSICAL EXAM:  /74 (BP Location: Left arm, Patient Position: Sitting)   Ht 1.657 m (5' 5.25\")   Wt 110 kg (242 lb 12.8 oz)   LMP 02/22/2025   BMI 40.10 kg/m²   GEN:  A&O, NAD  HEENT:  head HC/AT, no visible goiter  Derm: hair noted on chin  PSYCH:  normal affect, non-anxious      IMPRESSION/PLAN:  A: No menses since 2/22. UPT negative at home. Has been trying achieve a pregnancy since Jan. Gastric bipass 10/24, has lost 50lbs. Hair growth on chin noted.   Plan: 1. PCOS labs. 2. Provera challenge test 1 every day x 10days. 3. When she has a withdrawal bleed she will have her sonogram done on day 12-14 and infertility labs day 21-23. She then will follow up with  to discuss labs and possible options for achieving a pregnancy.   Problem List " Items Addressed This Visit    None           SANTHOSH Corcoran-YAZANM

## 2025-05-15 LAB
ESTROGEN SERPL-MCNC: NORMAL PG/ML
FSH SERPL-ACNC: 5.5 MIU/ML
LH SERPL-ACNC: 8.6 MIU/ML
MIS SERPL-MCNC: NORMAL NG/ML
PROGEST SERPL-MCNC: <0.5 NG/ML
PROLACTIN SERPL-MCNC: 11.8 NG/ML
TESTOST FREE SERPL-MCNC: NORMAL PG/ML
TESTOST SERPL-MCNC: NORMAL NG/DL
TSH SERPL-ACNC: 1 MIU/L

## 2025-05-20 LAB
ESTROGEN SERPL-MCNC: 167 PG/ML
FSH SERPL-ACNC: 5.5 MIU/ML
LH SERPL-ACNC: 8.6 MIU/ML
MIS SERPL-MCNC: 1.53 NG/ML (ref 0.69–13.39)
PROGEST SERPL-MCNC: <0.5 NG/ML
PROLACTIN SERPL-MCNC: 11.8 NG/ML
TESTOST FREE SERPL-MCNC: 5.1 PG/ML (ref 0.1–6.4)
TESTOST SERPL-MCNC: 40 NG/DL (ref 2–45)
TSH SERPL-ACNC: 1 MIU/L

## 2025-05-24 ENCOUNTER — HOSPITAL ENCOUNTER (OUTPATIENT)
Dept: RADIOLOGY | Facility: HOSPITAL | Age: 30
Discharge: HOME | End: 2025-05-24
Payer: COMMERCIAL

## 2025-05-24 DIAGNOSIS — N91.1 AMENORRHEA, SECONDARY: ICD-10-CM

## 2025-05-24 PROCEDURE — 76856 US EXAM PELVIC COMPLETE: CPT

## 2025-05-24 PROCEDURE — 76830 TRANSVAGINAL US NON-OB: CPT | Performed by: RADIOLOGY

## 2025-05-24 PROCEDURE — 76856 US EXAM PELVIC COMPLETE: CPT | Performed by: RADIOLOGY

## 2025-06-02 ENCOUNTER — APPOINTMENT (OUTPATIENT)
Dept: OBSTETRICS AND GYNECOLOGY | Facility: CLINIC | Age: 30
End: 2025-06-02
Payer: COMMERCIAL

## 2025-06-02 VITALS
SYSTOLIC BLOOD PRESSURE: 100 MMHG | WEIGHT: 240.4 LBS | DIASTOLIC BLOOD PRESSURE: 68 MMHG | HEIGHT: 65 IN | BODY MASS INDEX: 40.05 KG/M2

## 2025-06-02 DIAGNOSIS — Z31.69 ENCOUNTER FOR PRECONCEPTION CONSULTATION: ICD-10-CM

## 2025-06-02 DIAGNOSIS — N97.0 PRIMARY ANOVULATORY INFERTILITY: Primary | ICD-10-CM

## 2025-06-02 DIAGNOSIS — N93.9 ABNORMAL UTERINE BLEEDING (AUB): ICD-10-CM

## 2025-06-02 DIAGNOSIS — E66.9 OBESITY (BMI 30-39.9): ICD-10-CM

## 2025-06-02 DIAGNOSIS — Z98.84 HISTORY OF ROUX-EN-Y GASTRIC BYPASS: ICD-10-CM

## 2025-06-02 PROBLEM — E66.01 MORBID OBESITY (MULTI): Status: RESOLVED | Noted: 2024-03-07 | Resolved: 2025-06-02

## 2025-06-02 PROBLEM — Z09 SURGERY FOLLOW-UP EXAMINATION: Status: RESOLVED | Noted: 2024-10-21 | Resolved: 2025-06-02

## 2025-06-02 PROBLEM — Z71.89 ENCOUNTER FOR PRE-BARIATRIC SURGERY COUNSELING AND EDUCATION: Status: RESOLVED | Noted: 2024-09-18 | Resolved: 2025-06-02

## 2025-06-02 PROBLEM — E66.01 MORBID OBESITY WITH BMI OF 45.0-49.9, ADULT (MULTI): Status: RESOLVED | Noted: 2024-02-13 | Resolved: 2025-06-02

## 2025-06-02 PROBLEM — Z30.41 USES ORAL CONTRACEPTION: Status: RESOLVED | Noted: 2024-02-13 | Resolved: 2025-06-02

## 2025-06-02 PROCEDURE — 1036F TOBACCO NON-USER: CPT | Performed by: OBSTETRICS & GYNECOLOGY

## 2025-06-02 PROCEDURE — 3008F BODY MASS INDEX DOCD: CPT | Performed by: OBSTETRICS & GYNECOLOGY

## 2025-06-02 PROCEDURE — 99204 OFFICE O/P NEW MOD 45 MIN: CPT | Performed by: OBSTETRICS & GYNECOLOGY

## 2025-06-02 RX ORDER — LETROZOLE 2.5 MG/1
2.5 TABLET, FILM COATED ORAL DAILY
Qty: 5 TABLET | Refills: 1 | Status: SHIPPED | OUTPATIENT
Start: 2025-06-02 | End: 2025-06-07

## 2025-06-02 NOTE — PROGRESS NOTES
"GYNECOLOGY PROGRESS NOTE          CC:     Chief Complaint   Patient presents with    Follow-up     Here today to discuss labs/US results for infertility ordered by CATALINA Ocampo        HPI:  Sindhu Herron is here with new complaint of infertility.  Periods are regular and not painful.  Has been trying to conceive since stopping BCPs 10/2024 prior to gastric bypass surgery.  Prior OB history is none.  No history or STDs/PID, endometriosis, or abdominal surgeries.   Spouse's name is Nara Herron ( 95)--has no children, no DM/HTN, no erectile problems or history of genital surgery, smoking/THC use:  no/no.  Patient's last menstrual period was 2025.   Periods irregular off BCPs, last period prior to the Provera withdrawal bleeding that occurred in mid-May was in February.     S/P L/S Yoel-en-Y gastric bypass surgery 10/2024, no longer sees bariatric surgeon, was told to wait 6 months postop to conceive.        ROS:  GYN - see HPI  DERM - no acne or facial hair growth      HISTORY:  Past Medical History:  No date: Anxiety disorder, unspecified  No date: Bipolar 1 disorder (Multi)      Comment:  with anxiety  No date: GERD (gastroesophageal reflux disease)  No date: Hypercholesterolemia  No date: Sleep apnea  Past Surgical History:  10/07/2024: GASTRIC BYPASS  No date: WISDOM TOOTH EXTRACTION   reports that she has never smoked. She has never been exposed to tobacco smoke. She has never used smokeless tobacco. She reports that she does not currently use alcohol after a past usage of about 1.0 standard drink of alcohol per week. She reports that she does not use drugs.        PHYSICAL EXAM:  /68 (BP Location: Left arm, Patient Position: Sitting)   Ht 1.657 m (5' 5.25\")   Wt 109 kg (240 lb 6.4 oz)   LMP 2025   BMI 39.70 kg/m²   GEN:  A&O, NAD  HEENT:  head NC/AT, conjunctiva clear, no visible goiter  DERM:  no hirsutism or acne  PSYCH:  normal affect, non-anxious      IMAGING RESULTS:  "   05/24/25 (DAY 10) - US PELVIS TRANSABDOMINAL WITH TRANSVAGINAL    - Impression -  1.  Negative pelvic ultrasound.    **I personally reviewed the pelvic ultrasound images and my impressions are normal uterus/endometrium/ovaries, no free fluid, no dominant follicle       FSH   Date Value Ref Range Status   05/15/2025 5.5 mIU/mL Final     Comment:                         Reference Range                        Follicular Phase       2.5-10.2               Mid-cycle Peak         3.1-17.7               Luteal Phase           1.5- 9.1               Postmenopausal       23.0-116.3                                   LH   Date Value Ref Range Status   05/15/2025 8.6 mIU/mL Final     Comment:                Reference Range             Follicular Phase  1.9-12.5             Mid-Cycle Peak    8.7-76.3             Luteal Phase      0.5-16.9             Postmenopausal    10.0-54.7       PROGESTERONE   Date Value Ref Range Status   05/15/2025 <0.5 ng/mL Final     Comment:                 Reference Ranges           Female              Follicular Phase     < 1.0              Luteal Phase      2.6-21.5              Post menopausal      < 0.5              Pregnancy              1st Trimester     4.1-34.0              2nd Trimester    24.0-76.0              3rd Trimester   52.0-302.0       TSH W/REFLEX TO FT4   Date Value Ref Range Status   05/15/2025 1.00 mIU/L Final     Comment:               Reference Range                         > or = 20 Years  0.40-4.50                              Pregnancy Ranges            First trimester    0.26-2.66            Second trimester   0.55-2.73            Third trimester    0.43-2.91       PROLACTIN   Date Value Ref Range Status   05/15/2025 11.8 ng/mL Final     Comment:                 Reference Range   Females          Non-pregnant        3.0-30.0          Pregnant           10.0-209.0          Postmenopausal      2.0-20.0                                           AMH=1.53      IMPRESSION/PLAN:  Problem List Items Addressed This Visit    None  Visit Diagnoses         Infertility, female    -  Primary    Relevant Medications    letrozole (Femara) 2.5 mg tablet    Other Relevant Orders    Progesterone      Abnormal uterine bleeding (AUB)          Encounter for preconception consultation          Obesity (BMI 30-39.9)                AUB:  Anovulatory cycles.  No clinical evidence of hyperandrogenism.  Normal AMH level.  No concern for PCOS.    + Successful Provera withdrawal bleed, will repeat PRN if skipped cycles.  No indication for endometrial sampling given recent onset of AUB symptoms (since stopping OCPs 10/2024) and normal endometrium on US.  Pap wnl in 2024.      Primary Anovulatory Infertility:  Trying only x 8 months, but having obvious anovulatory cycles off of BCPs.  Diagnosis of infertility and potential etiologies reviewed with the patient.  Basic workup results as ordered by CNM reviewed--normal AMH/TSH/PRL and normal near mid-cycle pelvic ultrasound (NO DOMINANT FOLLICLES).  Labs done on day 2 of cyclic, so repeat day 21-23 progesterone level ordered today--OK to do today on day 20 of cycle.  Semen analysis ordered for partner.  Will hold on HSG testing for now, as no risk factors for tubal factor disease.  Indications for referral to [facility] include failed ovulation induction, unexplained infertility, or tubal factor infertility.  Infertility risk factors=obesity.    Anovulatory Infertility:  Ovulation induction medication consultation done.  Ovulation induction calendar provided and my protocol for use reviewed.  FDA-approved Clomid Rx discussed.  Potential off-label use of Femara also discussed, given that the generic of Clomid is no longer available and trade Clomid is more expensive than generic Femara and may be difficult to find at a pharmacy.  If patient does not respond to Clomid or if has significant side effects with use of Clomid then  will switch Rx   over from Clomid to Femara.  Rx for Femara 2.5 mg provided, use/AE reviewed--bloating, mood disorders, hyperstimulation syndrome, multiple gestation (5-10%).  My recommendation for monthly monitoring with day 23 (to 25) progesterone levels also discussed and standing 6-month day progesterone level ordered.  If patient is not pregnant after 6 months of Clomid/Femara then will need to see [facility] for further treatment and would tubal patency evaluation.    Preconceptual counseling:  PMHx/PSHx/Social Hx/FMHx/Meds reviewed, pertinent pregnancy issues include obesity (improved) and prior gastric bypass surgery.  Preconceptual use of OTC folic acid 400 µg or more recommended 2 months prior to conception.  Recommend all vaccinations be UTD prior to pregnancy--including flu, Covid, and chickenpox (if not previously immunized or infected).  No FMHx of Cystic fibrosis, Sickle Cell Disease/Trait, or birth defects.  Optimization of weight prior to pregnancy encouraged.  Counseled patient that I am no longer doing inpatient OB, referral to CNM and/or another OB physician in practice given for future OB care.      Dayton Barbosa, DO

## 2025-06-03 LAB — PROGEST SERPL-MCNC: <0.5 NG/ML

## 2025-06-16 DIAGNOSIS — N91.1 AMENORRHEA, SECONDARY: ICD-10-CM

## 2025-06-16 RX ORDER — MEDROXYPROGESTERONE ACETATE 10 MG/1
10 TABLET ORAL DAILY
Qty: 10 TABLET | Refills: 0 | Status: SHIPPED | OUTPATIENT
Start: 2025-06-16 | End: 2025-06-26

## 2025-06-27 DIAGNOSIS — N97.0 PRIMARY ANOVULATORY INFERTILITY: ICD-10-CM

## 2025-07-16 LAB — PROGEST SERPL-MCNC: <0.5 NG/ML

## 2025-07-17 ENCOUNTER — RESULTS FOLLOW-UP (OUTPATIENT)
Dept: OBSTETRICS AND GYNECOLOGY | Facility: CLINIC | Age: 30
End: 2025-07-17
Payer: COMMERCIAL

## 2025-07-17 DIAGNOSIS — N97.9 INFERTILITY, FEMALE: ICD-10-CM

## 2025-07-17 RX ORDER — LETROZOLE 2.5 MG/1
5 TABLET, FILM COATED ORAL DAILY
Qty: 10 TABLET | Refills: 1 | Status: SHIPPED | OUTPATIENT
Start: 2025-07-17 | End: 2025-07-22

## 2025-07-17 NOTE — TELEPHONE ENCOUNTER
----- Message from Dayton Barbosa sent at 7/17/2025  8:09 AM EDT -----  Call patient need to increase Femara to 5 mg not ovulating this  ----- Message -----  From: [facility] Results In  Sent: 7/16/2025  11:27 PM EDT  To: Dayton Barbosa, DO

## 2025-07-25 DIAGNOSIS — N97.0 PRIMARY ANOVULATORY INFERTILITY: ICD-10-CM

## 2025-08-08 DIAGNOSIS — N91.1 AMENORRHEA, SECONDARY: ICD-10-CM

## 2025-08-08 RX ORDER — MEDROXYPROGESTERONE ACETATE 10 MG/1
10 TABLET ORAL DAILY
Qty: 10 TABLET | Refills: 3 | Status: SHIPPED | OUTPATIENT
Start: 2025-08-08 | End: 2025-08-18

## 2025-08-22 DIAGNOSIS — N97.0 PRIMARY ANOVULATORY INFERTILITY: ICD-10-CM

## (undated) DEVICE — GLOVE, SURGICAL, PROTEXIS PI BLUE W/NEUTHERA, 6.5, PF, LF

## (undated) DEVICE — SOLUTION, IRRIGATION, X RX SODIUM CHL 0.9%, 1000ML BTL

## (undated) DEVICE — SOLUTION, INJECTION, 0.9% SODIUM CHL, USP LIFECARE 1000 MI

## (undated) DEVICE — SUTURE, VICRYL, 3-0, 27 IN, SH, VIOLET

## (undated) DEVICE — TUBE SET, PNEUMOLAR HEATED, SMOKE EVACU, HIGH-FLOW

## (undated) DEVICE — SEALANT, FIBRIN, VISTASEAL HUMAN, 4ML, 12/PK

## (undated) DEVICE — SLEEVE, KII, Z-THREAD, 12X100CM

## (undated) DEVICE — WATER STERILE, FOR IRRIGATION, 1000ML, W/HANGER

## (undated) DEVICE — Device

## (undated) DEVICE — APPLIER,  LIGACLIP, ENDO ROTATE, ROTATING, 10MM 20M/L, DISP

## (undated) DEVICE — ADHESIVE, SKIN, DERMABOND ADVANCED, 15CM, PEN-STYLE

## (undated) DEVICE — SCISSORS, HOOK, 5MM X 31CM, DISP

## (undated) DEVICE — STAPLER, ENDO ECHELON 45MM RELOAD, WHITE, REUSABLE

## (undated) DEVICE — APPLICATOR, CHLORAPREP, W/ORANGE TINT, 26ML

## (undated) DEVICE — CARE KIT, LAPAROSCOPIC, ADVANCED

## (undated) DEVICE — STAPLER,  ENDO ECHELON 45MM RELOAD, BLUE

## (undated) DEVICE — SYRINGE, 60 CC, IRRIGATION, PISTON, CATH TIP, W/LUER ADAPTER,DISP

## (undated) DEVICE — GLOVE, SURGICAL, PROTEXIS PI , 6.5, PF, LF

## (undated) DEVICE — IRRIGATOR, WOUND, HYDRO SURG PLUS, W/O TIP, DISP

## (undated) DEVICE — SHEARS, HARMONIC 5 X 36 CVD ACE+7

## (undated) DEVICE — SPONGE, HEMOSTATIC, CELLULOSE, SURGICEL, 4 X 8 IN

## (undated) DEVICE — APPLICATOR, DUAL LAPAROSCOPIC, VISTASEAL, 35CM, RIGID

## (undated) DEVICE — TROCAR, KII OPTICAL BLADELESS 5MM Z THREAD 100MM LNGTH

## (undated) DEVICE — STAPLER, EF POWERED PLUS, CUTTER 340MM, 45MM EFFECTOR, DISP

## (undated) DEVICE — SUTURE, ETHIBOND, XTRA, 2-0, GREEN

## (undated) DEVICE — TROCAR, OPTICAL, BLADELESS, 12MM, THREADED, 100MM LENGTH

## (undated) DEVICE — SUTURE, VICRYL, 2-0, 27 IN, BR/SH 27, VIOLET

## (undated) DEVICE — CLIP, ABSORBABLE, VICRYL, LAPRA-TY, VIOLET

## (undated) DEVICE — SLEEVE, KII, Z-THREAD, 5X100CM

## (undated) DEVICE — GLOVE, SURGICAL, PROTEXIS PI , 7.0, PF, LF